# Patient Record
Sex: FEMALE | Race: WHITE | Employment: OTHER | URBAN - METROPOLITAN AREA
[De-identification: names, ages, dates, MRNs, and addresses within clinical notes are randomized per-mention and may not be internally consistent; named-entity substitution may affect disease eponyms.]

---

## 2017-01-15 PROBLEM — E83.51 HYPOCALCEMIA: Status: ACTIVE | Noted: 2017-01-15

## 2017-03-02 RX ORDER — METOCLOPRAMIDE 10 MG/1
10 TABLET ORAL ONCE
Status: CANCELLED | OUTPATIENT
Start: 2017-03-02 | End: 2017-03-02

## 2017-03-03 ENCOUNTER — ANESTHESIA EVENT (OUTPATIENT)
Dept: SURGERY | Facility: HOSPITAL | Age: 42
End: 2017-03-03
Payer: MEDICARE

## 2017-03-03 ENCOUNTER — SURGERY (OUTPATIENT)
Age: 42
End: 2017-03-03

## 2017-03-03 ENCOUNTER — HOSPITAL ENCOUNTER (OUTPATIENT)
Facility: HOSPITAL | Age: 42
Setting detail: HOSPITAL OUTPATIENT SURGERY
Discharge: HOME | End: 2017-03-03
Attending: OBSTETRICS & GYNECOLOGY | Admitting: OBSTETRICS & GYNECOLOGY
Payer: MEDICARE

## 2017-03-03 ENCOUNTER — ANESTHESIA (OUTPATIENT)
Dept: SURGERY | Facility: HOSPITAL | Age: 42
End: 2017-03-03
Payer: MEDICARE

## 2017-03-03 VITALS
WEIGHT: 170 LBS | SYSTOLIC BLOOD PRESSURE: 122 MMHG | BODY MASS INDEX: 33.38 KG/M2 | DIASTOLIC BLOOD PRESSURE: 80 MMHG | HEART RATE: 75 BPM | OXYGEN SATURATION: 100 % | TEMPERATURE: 98 F | HEIGHT: 60 IN | RESPIRATION RATE: 20 BRPM

## 2017-03-03 DIAGNOSIS — N84.0 ENDOMETRIAL POLYP: Primary | ICD-10-CM

## 2017-03-03 LAB — B-HCG UR QL: NEGATIVE

## 2017-03-03 PROCEDURE — 0UB98ZX EXCISION OF UTERUS, VIA NATURAL OR ARTIFICIAL OPENING ENDOSCOPIC, DIAGNOSTIC: ICD-10-PCS | Performed by: OBSTETRICS & GYNECOLOGY

## 2017-03-03 PROCEDURE — 0UDB8ZX EXTRACTION OF ENDOMETRIUM, VIA NATURAL OR ARTIFICIAL OPENING ENDOSCOPIC, DIAGNOSTIC: ICD-10-PCS | Performed by: OBSTETRICS & GYNECOLOGY

## 2017-03-03 PROCEDURE — 81025 URINE PREGNANCY TEST: CPT

## 2017-03-03 PROCEDURE — 88305 TISSUE EXAM BY PATHOLOGIST: CPT | Performed by: OBSTETRICS & GYNECOLOGY

## 2017-03-03 RX ORDER — ONDANSETRON 2 MG/ML
4 INJECTION INTRAMUSCULAR; INTRAVENOUS EVERY 8 HOURS PRN
Status: DISCONTINUED | OUTPATIENT
Start: 2017-03-03 | End: 2017-03-03

## 2017-03-03 RX ORDER — ACETAMINOPHEN 325 MG/1
650 TABLET ORAL ONCE
Status: DISCONTINUED | OUTPATIENT
Start: 2017-03-03 | End: 2017-03-03 | Stop reason: HOSPADM

## 2017-03-03 RX ORDER — HYDROCODONE BITARTRATE AND ACETAMINOPHEN 5; 325 MG/1; MG/1
2 TABLET ORAL EVERY 4 HOURS PRN
Status: DISCONTINUED | OUTPATIENT
Start: 2017-03-03 | End: 2017-03-03

## 2017-03-03 RX ORDER — DEXAMETHASONE SODIUM PHOSPHATE 4 MG/ML
VIAL (ML) INJECTION AS NEEDED
Status: DISCONTINUED | OUTPATIENT
Start: 2017-03-03 | End: 2017-03-03 | Stop reason: SURG

## 2017-03-03 RX ORDER — HYDROCODONE BITARTRATE AND ACETAMINOPHEN 5; 325 MG/1; MG/1
1 TABLET ORAL EVERY 4 HOURS PRN
Status: DISCONTINUED | OUTPATIENT
Start: 2017-03-03 | End: 2017-03-03

## 2017-03-03 RX ORDER — DOXYCYCLINE 100 MG/10ML
INJECTION, POWDER, LYOPHILIZED, FOR SOLUTION INTRAVENOUS AS NEEDED
Status: DISCONTINUED | OUTPATIENT
Start: 2017-03-03 | End: 2017-03-03 | Stop reason: SURG

## 2017-03-03 RX ORDER — MIDAZOLAM HYDROCHLORIDE 1 MG/ML
INJECTION INTRAMUSCULAR; INTRAVENOUS AS NEEDED
Status: DISCONTINUED | OUTPATIENT
Start: 2017-03-03 | End: 2017-03-03 | Stop reason: SURG

## 2017-03-03 RX ORDER — MORPHINE SULFATE 4 MG/ML
4 INJECTION, SOLUTION INTRAMUSCULAR; INTRAVENOUS EVERY 10 MIN PRN
Status: DISCONTINUED | OUTPATIENT
Start: 2017-03-03 | End: 2017-03-03

## 2017-03-03 RX ORDER — ACETAMINOPHEN 325 MG/1
650 TABLET ORAL EVERY 4 HOURS PRN
Status: DISCONTINUED | OUTPATIENT
Start: 2017-03-03 | End: 2017-03-03

## 2017-03-03 RX ORDER — MORPHINE SULFATE 2 MG/ML
2 INJECTION, SOLUTION INTRAMUSCULAR; INTRAVENOUS EVERY 10 MIN PRN
Status: DISCONTINUED | OUTPATIENT
Start: 2017-03-03 | End: 2017-03-03

## 2017-03-03 RX ORDER — HYDROMORPHONE HYDROCHLORIDE 1 MG/ML
0.4 INJECTION, SOLUTION INTRAMUSCULAR; INTRAVENOUS; SUBCUTANEOUS EVERY 2 HOUR PRN
Status: DISCONTINUED | OUTPATIENT
Start: 2017-03-03 | End: 2017-03-03

## 2017-03-03 RX ORDER — SODIUM CHLORIDE, SODIUM LACTATE, POTASSIUM CHLORIDE, CALCIUM CHLORIDE 600; 310; 30; 20 MG/100ML; MG/100ML; MG/100ML; MG/100ML
INJECTION, SOLUTION INTRAVENOUS CONTINUOUS
Status: DISCONTINUED | OUTPATIENT
Start: 2017-03-03 | End: 2017-03-03

## 2017-03-03 RX ORDER — ONDANSETRON 2 MG/ML
4 INJECTION INTRAMUSCULAR; INTRAVENOUS ONCE AS NEEDED
Status: DISCONTINUED | OUTPATIENT
Start: 2017-03-03 | End: 2017-03-03

## 2017-03-03 RX ORDER — HYDROCODONE BITARTRATE AND ACETAMINOPHEN 5; 325 MG/1; MG/1
2 TABLET ORAL AS NEEDED
Status: DISCONTINUED | OUTPATIENT
Start: 2017-03-03 | End: 2017-03-03

## 2017-03-03 RX ORDER — HYDROMORPHONE HYDROCHLORIDE 1 MG/ML
0.2 INJECTION, SOLUTION INTRAMUSCULAR; INTRAVENOUS; SUBCUTANEOUS EVERY 2 HOUR PRN
Status: DISCONTINUED | OUTPATIENT
Start: 2017-03-03 | End: 2017-03-03

## 2017-03-03 RX ORDER — HYDROMORPHONE HYDROCHLORIDE 1 MG/ML
0.8 INJECTION, SOLUTION INTRAMUSCULAR; INTRAVENOUS; SUBCUTANEOUS EVERY 2 HOUR PRN
Status: DISCONTINUED | OUTPATIENT
Start: 2017-03-03 | End: 2017-03-03

## 2017-03-03 RX ORDER — HYDROCODONE BITARTRATE AND ACETAMINOPHEN 5; 325 MG/1; MG/1
1 TABLET ORAL EVERY 6 HOURS PRN
Qty: 30 TABLET | Refills: 0 | Status: SHIPPED | COMMUNITY
Start: 2017-03-03 | End: 2017-06-19

## 2017-03-03 RX ORDER — HYDROCODONE BITARTRATE AND ACETAMINOPHEN 5; 325 MG/1; MG/1
1 TABLET ORAL AS NEEDED
Status: DISCONTINUED | OUTPATIENT
Start: 2017-03-03 | End: 2017-03-03

## 2017-03-03 RX ORDER — FAMOTIDINE 20 MG/1
20 TABLET ORAL ONCE
Status: COMPLETED | OUTPATIENT
Start: 2017-03-03 | End: 2017-03-03

## 2017-03-03 RX ORDER — NALOXONE HYDROCHLORIDE 0.4 MG/ML
80 INJECTION, SOLUTION INTRAMUSCULAR; INTRAVENOUS; SUBCUTANEOUS AS NEEDED
Status: DISCONTINUED | OUTPATIENT
Start: 2017-03-03 | End: 2017-03-03

## 2017-03-03 RX ORDER — LIDOCAINE HYDROCHLORIDE 10 MG/ML
INJECTION, SOLUTION EPIDURAL; INFILTRATION; INTRACAUDAL; PERINEURAL AS NEEDED
Status: DISCONTINUED | OUTPATIENT
Start: 2017-03-03 | End: 2017-03-03 | Stop reason: SURG

## 2017-03-03 RX ORDER — ONDANSETRON 2 MG/ML
INJECTION INTRAMUSCULAR; INTRAVENOUS AS NEEDED
Status: DISCONTINUED | OUTPATIENT
Start: 2017-03-03 | End: 2017-03-03 | Stop reason: SURG

## 2017-03-03 RX ORDER — MORPHINE SULFATE 10 MG/ML
6 INJECTION, SOLUTION INTRAMUSCULAR; INTRAVENOUS EVERY 10 MIN PRN
Status: DISCONTINUED | OUTPATIENT
Start: 2017-03-03 | End: 2017-03-03

## 2017-03-03 RX ORDER — HYDROMORPHONE HYDROCHLORIDE 1 MG/ML
0.2 INJECTION, SOLUTION INTRAMUSCULAR; INTRAVENOUS; SUBCUTANEOUS EVERY 5 MIN PRN
Status: DISCONTINUED | OUTPATIENT
Start: 2017-03-03 | End: 2017-03-03

## 2017-03-03 RX ORDER — HYDROMORPHONE HYDROCHLORIDE 1 MG/ML
0.4 INJECTION, SOLUTION INTRAMUSCULAR; INTRAVENOUS; SUBCUTANEOUS EVERY 5 MIN PRN
Status: DISCONTINUED | OUTPATIENT
Start: 2017-03-03 | End: 2017-03-03

## 2017-03-03 RX ORDER — ONDANSETRON HYDROCHLORIDE 8 MG/1
4 TABLET, FILM COATED ORAL EVERY 8 HOURS PRN
Status: DISCONTINUED | OUTPATIENT
Start: 2017-03-03 | End: 2017-03-03

## 2017-03-03 RX ORDER — HYDROMORPHONE HYDROCHLORIDE 1 MG/ML
0.6 INJECTION, SOLUTION INTRAMUSCULAR; INTRAVENOUS; SUBCUTANEOUS EVERY 5 MIN PRN
Status: DISCONTINUED | OUTPATIENT
Start: 2017-03-03 | End: 2017-03-03

## 2017-03-03 RX ADMIN — SODIUM CHLORIDE, SODIUM LACTATE, POTASSIUM CHLORIDE, CALCIUM CHLORIDE: 600; 310; 30; 20 INJECTION, SOLUTION INTRAVENOUS at 09:05:00

## 2017-03-03 RX ADMIN — SODIUM CHLORIDE, SODIUM LACTATE, POTASSIUM CHLORIDE, CALCIUM CHLORIDE: 600; 310; 30; 20 INJECTION, SOLUTION INTRAVENOUS at 09:50:00

## 2017-03-03 RX ADMIN — DOXYCYCLINE 200 MG: 100 INJECTION, POWDER, LYOPHILIZED, FOR SOLUTION INTRAVENOUS at 09:20:00

## 2017-03-03 RX ADMIN — MIDAZOLAM HYDROCHLORIDE 2 MG: 1 INJECTION INTRAMUSCULAR; INTRAVENOUS at 09:05:00

## 2017-03-03 RX ADMIN — DEXAMETHASONE SODIUM PHOSPHATE 4 MG: 4 MG/ML VIAL (ML) INJECTION at 09:06:00

## 2017-03-03 RX ADMIN — ONDANSETRON 4 MG: 2 INJECTION INTRAMUSCULAR; INTRAVENOUS at 09:45:00

## 2017-03-03 RX ADMIN — LIDOCAINE HYDROCHLORIDE 30 MG: 10 INJECTION, SOLUTION EPIDURAL; INFILTRATION; INTRACAUDAL; PERINEURAL at 09:05:00

## 2017-03-03 NOTE — INTERVAL H&P NOTE
Pre-op Diagnosis: Menorrhagia with regular cycle    The above referenced H&P was reviewed by Mo Guadalupe MD on 3/3/2017, the patient was examined and no significant changes have occurred in the patient's condition since the H&P was performed.   I discus

## 2017-03-03 NOTE — DISCHARGE SUMMARY
[facility] HOSP - [facility]    Discharge Summary    [facility]:  [facility]    1975 MRN [id number]   Location [place] [address] Attending Antonino Nichols MD   Hosp Day # 0 PCP Saturnino Roberts MD, Myles Alonso tolerated    Discharge Activity: As tolerated    Follow up:            Other Discharge Instructions:        HOME INSTRUCTIONS  AMBSURG HOME CARE INSTRUCTIONS: POST-OP ANESTHESIA  The medication that you received for sedation or general anesthesia can last u problem when you are at home:    · Call your surgeons office         30 Days    After Your Surgery    [facility]'s commitment to quality care does not end  when you leave the hospital    We are gathering information on the outcomes of our patients a

## 2017-03-03 NOTE — ANESTHESIA POSTPROCEDURE EVALUATION
Patient: Samir Le    Procedure Summary     Date Anesthesia Start Anesthesia Stop Room / Location    03/03/17 0904 0959 300 [facility] MAIN OR 03 / 300 [facility] MAIN OR       Procedure Diagnosis Surgeon Responsible Provider    MYOMECTOMY HYSTEROSCOPIC (N/A Uterus) (Shyanne Oh

## 2017-03-03 NOTE — H&P (VIEW-ONLY)
Pre-Operative History and Physical    Diagnosis: No diagnosis found. Planned Procedure: hysteroscopy, D&C possible morcellation. HPI:  Marixa Serra is a 42year old  No LMP recorded.  who presents for hysteroscopy, D&C    Had US done, showed ma SURGERY  12/10/14    Comment left AKIL    HIP REPLACEMENT SURGERY  2015    Comment Right AKIL, Dr. Yen Camarena  1/2014    Comment Left knee arthroscopy    OTHER SURGICAL HISTORY      Comment nathaly bunionectomies and correction toes    OTHER Smokeless tobacco: Not on file    Alcohol Use: No    Drug Use: No    Sexual Activity: Not Currently     Other Topics Concern    Exercise Yes    Comment: walking     Social History Narrative    SOC HX:    occupation: graphic design, marital status: single,

## 2017-03-03 NOTE — OPERATIVE REPORT
[facility]    PATIENT'S NAME: BAKARI ARROYO   ATTENDING PHYSICIAN: Ashley Banks MD   OPERATING PHYSICIAN: Ashley Banks MD   PATIENT ACCOUNT#:   [id number]    LOCATION:  [place] [address]  MEDICAL RECORD #:   [id number]       DATE OF BIRTH: room in good condition.     Dictated By Simba Maldonado MD  d: 03/03/2017 09:56:30  t: 03/03/2017 17:36:46  Job [id number]  JF/

## 2017-03-03 NOTE — BRIEF OP NOTE
Richie 44  Brief Op Note     Allison Bottom Location: OR   CSN [id number] MRN [id number]   Admission Date 3/3/2017 Operation Date 3/3/2017   Attending Physician Gaston Hallman MD Operating Physician Kenneth Oakley MD       Pre-Operat

## 2017-03-03 NOTE — ANESTHESIA PREPROCEDURE EVALUATION
Anesthesia PreOp Note    HPI:     Sam Hutson is a 42year old female who presents for preoperative consultation requested by: Steffi Rosa MD    Date of Surgery: 3/3/2017    Procedure(s):   MYOMECTOMY HYSTEROSCOPIC  Indication: Menorrhagia with regula without aura, without mention of intractable migraine without mention of status migrainosus         Date Noted: 07/02/2007      Unspecified hypothyroidism         Date Noted: 03/06/2007      Irritable bowel syndrome         Date Noted: 03/06/2007        Pa at Unknown time   TraZODone HCl 50 MG Oral Tab nightly as needed.    Disp:  Rfl:  3/2/2017 at Unknown time   ferrous sulfate 325 (65 FE) MG Oral Tab EC Take 325 mg by mouth daily with breakfast. Disp:  Rfl:  3/2/2017 at Unknown time   Pantoprazole Sodium (P CRNA 200 mg at 03/03/17 0920     No current [facility]-ordered outpatient prescriptions on file.       Cat Dander [Dander]       Meloxicam               Coughing  Seasonal                    Family History   Problem Relation Age of Onset   • Cancer Mother      ce Pulse: 72   Temp: 98.1 °F (36.7 °C)   TempSrc: Oral   Resp: 19   Height: 1.524 m (5')   Weight: 77.111 kg (170 lb)   SpO2: 93%        Anesthesia ROS/Med Hx and Physical Exam     Patient summary reviewed and Nursing notes reviewed    No history of anesthe

## 2017-04-06 PROCEDURE — 86038 ANTINUCLEAR ANTIBODIES: CPT | Performed by: INTERNAL MEDICINE

## 2017-04-06 PROCEDURE — 87340 HEPATITIS B SURFACE AG IA: CPT | Performed by: INTERNAL MEDICINE

## 2017-04-06 PROCEDURE — 86803 HEPATITIS C AB TEST: CPT | Performed by: INTERNAL MEDICINE

## 2017-04-06 PROCEDURE — 86704 HEP B CORE ANTIBODY TOTAL: CPT | Performed by: INTERNAL MEDICINE

## 2017-04-06 PROCEDURE — 86812 HLA TYPING A B OR C: CPT | Performed by: INTERNAL MEDICINE

## 2017-05-31 PROBLEM — M06.09 RHEUMATOID ARTHRITIS OF MULTIPLE SITES WITH NEGATIVE RHEUMATOID FACTOR (HCC): Status: ACTIVE | Noted: 2017-05-31

## 2017-06-20 PROBLEM — M54.41 CHRONIC LOW BACK PAIN WITH RIGHT-SIDED SCIATICA, UNSPECIFIED BACK PAIN LATERALITY: Status: ACTIVE | Noted: 2017-06-20

## 2017-06-20 PROBLEM — G89.29 CHRONIC LOW BACK PAIN WITH RIGHT-SIDED SCIATICA, UNSPECIFIED BACK PAIN LATERALITY: Status: ACTIVE | Noted: 2017-06-20

## 2017-12-13 PROBLEM — Z79.899 ENCOUNTER FOR DRUG THERAPY: Status: ACTIVE | Noted: 2017-12-13

## 2017-12-13 PROCEDURE — 86200 CCP ANTIBODY: CPT | Performed by: INTERNAL MEDICINE

## 2018-04-15 PROBLEM — Z96.652 STATUS POST LEFT KNEE REPLACEMENT: Status: ACTIVE | Noted: 2018-04-15

## 2018-04-15 PROBLEM — M25.561 RIGHT KNEE PAIN, UNSPECIFIED CHRONICITY: Status: ACTIVE | Noted: 2018-04-15

## 2018-04-15 PROBLEM — M16.11 PRIMARY OSTEOARTHRITIS OF RIGHT HIP: Status: ACTIVE | Noted: 2018-04-15

## 2018-08-06 PROBLEM — M51.36 LUMBAR DEGENERATIVE DISC DISEASE: Status: ACTIVE | Noted: 2018-08-06

## 2018-08-06 PROBLEM — M51.369 LUMBAR DEGENERATIVE DISC DISEASE: Status: ACTIVE | Noted: 2018-08-06

## 2018-08-28 PROCEDURE — 86480 TB TEST CELL IMMUN MEASURE: CPT | Performed by: INTERNAL MEDICINE

## 2018-09-07 PROBLEM — K80.20 CALCULUS OF GALLBLADDER WITHOUT CHOLECYSTITIS WITHOUT OBSTRUCTION: Chronic | Status: ACTIVE | Noted: 2018-09-07

## 2018-10-20 PROCEDURE — 87186 SC STD MICRODIL/AGAR DIL: CPT | Performed by: EMERGENCY MEDICINE

## 2018-10-20 PROCEDURE — 87088 URINE BACTERIA CULTURE: CPT | Performed by: EMERGENCY MEDICINE

## 2018-10-20 PROCEDURE — 87086 URINE CULTURE/COLONY COUNT: CPT | Performed by: EMERGENCY MEDICINE

## 2018-11-13 PROBLEM — Z96.659 HISTORY OF PARTIAL KNEE REPLACEMENT: Status: ACTIVE | Noted: 2018-04-15

## 2019-02-26 PROCEDURE — 87086 URINE CULTURE/COLONY COUNT: CPT | Performed by: PHYSICIAN ASSISTANT

## 2019-03-21 PROCEDURE — 88304 TISSUE EXAM BY PATHOLOGIST: CPT | Performed by: SURGERY

## 2019-07-10 ENCOUNTER — PATIENT MESSAGE (OUTPATIENT)
Dept: RHEUMATOLOGY | Facility: CLINIC | Age: 44
End: 2019-07-10

## 2019-07-11 NOTE — TELEPHONE ENCOUNTER
From: Americo Teixeira  To: Yee Bruno MD  Sent: 7/10/2019 11:25 PM CDT  Subject: Other    I will be a new patient with you. I have an appointment scheduled with you on August 7.  I tried to fill out some of the forms on line, but they were a little co

## 2019-08-07 ENCOUNTER — OFFICE VISIT (OUTPATIENT)
Dept: RHEUMATOLOGY | Facility: CLINIC | Age: 44
End: 2019-08-07
Payer: MEDICARE

## 2019-08-07 VITALS
WEIGHT: 166 LBS | HEIGHT: 60 IN | BODY MASS INDEX: 32.59 KG/M2 | DIASTOLIC BLOOD PRESSURE: 76 MMHG | HEART RATE: 72 BPM | SYSTOLIC BLOOD PRESSURE: 115 MMHG

## 2019-08-07 DIAGNOSIS — M06.09 RHEUMATOID ARTHRITIS OF MULTIPLE SITES WITH NEGATIVE RHEUMATOID FACTOR (HCC): Primary | ICD-10-CM

## 2019-08-07 PROCEDURE — 99204 OFFICE O/P NEW MOD 45 MIN: CPT | Performed by: INTERNAL MEDICINE

## 2019-08-07 PROCEDURE — G0463 HOSPITAL OUTPT CLINIC VISIT: HCPCS | Performed by: INTERNAL MEDICINE

## 2019-08-07 RX ORDER — TRAZODONE HYDROCHLORIDE 50 MG/1
50 TABLET ORAL NIGHTLY PRN
Refills: 1 | COMMUNITY
Start: 2019-07-12 | End: 2021-05-05 | Stop reason: ALTCHOICE

## 2019-08-07 NOTE — PROGRESS NOTES
Dear Dr. Christianne Kelly:    I saw your patient Kylee Yost in consultation this afternoon in my rheumatology clinic, concerning seronegative rheumatoid arthritis. As you know, she is an unfortunate 79-year-old woman who in 2014 developed knee and hip pain.   She had fists and  objects. On review of her chart April of 2017, HLA-B27, CCP antibody, and SHI were negative. December of 2017, CCP antibody and rheumatoid factor were negative.   August 2018, C-reactive protein and sed rate of 9 were normal.  April 25th, inflammation. Recently she has had an external ear infection that is almost gone. No lymphadenopathy. No shortness of breath or chest pain. No acid reflux, stomach pain, nausea or vomiting, constipation or diarrhea, blood in her stools.   No trouble uri hip replacements. Left knee  patellofemoral prosthesis. Bilateral bunion and hammertoe surgeries. 2.  History of seronegative rheumatoid arthritis. This seems to be a questionable diagnosis.   I do not detect synovitis on exam.  Labs and x-rays to my

## 2019-08-25 ENCOUNTER — PATIENT MESSAGE (OUTPATIENT)
Dept: RHEUMATOLOGY | Facility: CLINIC | Age: 44
End: 2019-08-25

## 2019-08-26 ENCOUNTER — TELEPHONE (OUTPATIENT)
Dept: RHEUMATOLOGY | Facility: CLINIC | Age: 44
End: 2019-08-26

## 2019-08-26 NOTE — TELEPHONE ENCOUNTER
Regarding: Other  Contact: [phone]  ----- Message from Michelle Peacock RN sent at 8/26/2019  3:24 PM CDT -----       ----- Message from Isaiah Harkins to Nupur Miller MD sent at 8/25/2019 10:10 AM -----   Doctor Lambert Spatz,  I saw a previous doctor a while ago, and then returned to [place] [address]. He had recommended that I use a walker. The letter of recommendation is a little old, and I was hoping that you could give me an updated letter so I could have it on file. I've tried to walk with my cane and it is still difficult walking just a 1/2 block with it. So I am still using the walker for long distances. I am hoping after I start the Arthritis treatment it will get better. I am still doing the Yoga in a chair (trying to do the exercises that I can). Also the cyst on my right hand is starting to bother me a lot. I've attached that letter so you could see it regarding me needing the walker. I will see you on the 28th. And I've arranged for transportation to come later to pick me up in case you are running behind. They also said I could call them. So no worries. (I can also walk to the train station slowly and take the train back if I need to. It's not that far from your office).   Poly Allen  [phone]

## 2019-08-26 NOTE — TELEPHONE ENCOUNTER
From: Norm Drummond  To: Cate Park MD  Sent: 8/25/2019 10:10 AM CDT  Subject: Other    Doctor Inocencio Perez,  I saw a previous doctor a while ago, and then returned to [place] [address]. He had recommended that I use a walker.  The letter of recomme

## 2019-08-26 NOTE — TELEPHONE ENCOUNTER
Please see pt's request below. Letter of recommendation for use of walker pended if agreeable. Pt has f/u appt on 8/28.

## 2019-08-28 ENCOUNTER — TELEPHONE (OUTPATIENT)
Dept: RHEUMATOLOGY | Facility: CLINIC | Age: 44
End: 2019-08-28

## 2019-08-28 ENCOUNTER — PATIENT MESSAGE (OUTPATIENT)
Dept: RHEUMATOLOGY | Facility: CLINIC | Age: 44
End: 2019-08-28

## 2019-08-28 ENCOUNTER — OFFICE VISIT (OUTPATIENT)
Dept: RHEUMATOLOGY | Facility: CLINIC | Age: 44
End: 2019-08-28
Payer: MEDICARE

## 2019-08-28 VITALS
SYSTOLIC BLOOD PRESSURE: 109 MMHG | BODY MASS INDEX: 32 KG/M2 | HEIGHT: 60 IN | DIASTOLIC BLOOD PRESSURE: 73 MMHG | WEIGHT: 163 LBS | HEART RATE: 80 BPM

## 2019-08-28 DIAGNOSIS — M06.09 RHEUMATOID ARTHRITIS OF MULTIPLE SITES WITH NEGATIVE RHEUMATOID FACTOR (HCC): Primary | ICD-10-CM

## 2019-08-28 PROCEDURE — 99213 OFFICE O/P EST LOW 20 MIN: CPT | Performed by: INTERNAL MEDICINE

## 2019-08-28 PROCEDURE — G0463 HOSPITAL OUTPT CLINIC VISIT: HCPCS | Performed by: INTERNAL MEDICINE

## 2019-08-28 NOTE — TELEPHONE ENCOUNTER
Please see pt's message below. Pt was seen in office today and provider's note not completed at this time.

## 2019-08-28 NOTE — TELEPHONE ENCOUNTER
Pt states she would like to know what her treatment care she will be receiving from Dr. Prais Puckett per pt she has been with pain and by now she should be in some kind of medication.  Per pt unsure why she needs to get an MRI for her wrist. Per pt she has RA

## 2019-08-28 NOTE — TELEPHONE ENCOUNTER
Please let her know that it is important to get the MRI of her wrist and hand. We need to see how much joint inflammation there is, so that we can determine her best treatment.     Once I have reviewed her hand MRI results, I will call her, and we will jasiel

## 2019-08-29 ENCOUNTER — PATIENT MESSAGE (OUTPATIENT)
Dept: RHEUMATOLOGY | Facility: CLINIC | Age: 44
End: 2019-08-29

## 2019-08-29 NOTE — TELEPHONE ENCOUNTER
From: Caron Turner  To: January Calle MD  Sent: 8/28/2019 6:20 PM CDT  Subject: Other    Doctor Lu Acosta, or Kristopher Duong,  Thank you for providing me with the updated letter for the walker needed. However, I do not have a working printer at this time.  Wo replacement, but maybe you can if you can access my records. But my left knee was done in 2016 by Doctor Irene Britt. [facility] Petellofemoral replacement. [employer] cobalt, chromium and polyethylene.  Hope cathleen

## 2019-08-29 NOTE — TELEPHONE ENCOUNTER
From: Marixa Serra  To: Michelle Holden MD  Sent: 8/29/2019 10:18 AM CDT  Subject: Other    Doctor Criss Julian and RN Clif Davenport,  I apologize about the last message I was confused but Clif Davenport helped explain things. Now I understand your plan of action.  Please no

## 2019-08-29 NOTE — TELEPHONE ENCOUNTER
Pt expressed concerns in regards to traveling to hospital location for MRI. Per [place] imaging dept, hand MRIs are only completed at main campus due to specialized coil.      Pt was informed MRI will need to be scheduled at hospital and provided with infor

## 2019-08-29 NOTE — PROGRESS NOTES
Dear Dr. Lyudmila Solano:    I saw your patient Demetris Reardon in follow-up this afternoon in my rheumatology clinic. She has swelling and soreness across her hand MCP joints, right greater than left, worse in the morning. It takes 20 minutes to loosen.   A warm show disorder, on treatment. 5.  Apparent bilateral hand ganglion cysts. Thank you for inviting me to participate in her care. Her follow-up will be set up after her hand MRI results are reviewed. Sincerely,      Francheska Miguel MD   Rheumatology.

## 2019-08-29 NOTE — TELEPHONE ENCOUNTER
HANSEL Bolaños pt was informed MRI of hand needs to be completed at main campus due to specialized MRI coils (which are not available in [place]).  She was provided with [employer] transportation services contact information to set up transportation between [address] [place] [address]

## 2019-08-29 NOTE — TELEPHONE ENCOUNTER
Pt returned call and was informed of Dr. Andria Hernandez message below. Pt verbalized understanding. Pt aware no PA required for MRI and she may proceed with scheduling test. Pt was transferred to central scheduling.

## 2020-01-22 PROBLEM — M67.432 GANGLION CYST OF BOTH WRISTS: Status: ACTIVE | Noted: 2020-01-22

## 2020-01-22 PROBLEM — M67.431 GANGLION CYST OF BOTH WRISTS: Status: ACTIVE | Noted: 2020-01-22

## 2020-01-22 PROBLEM — M15.9 PRIMARY OSTEOARTHRITIS INVOLVING MULTIPLE JOINTS: Status: ACTIVE | Noted: 2020-01-22

## 2020-01-22 PROBLEM — M15.0 PRIMARY OSTEOARTHRITIS INVOLVING MULTIPLE JOINTS: Status: ACTIVE | Noted: 2020-01-22

## 2020-11-12 PROBLEM — M79.7 FIBROMYALGIA: Status: ACTIVE | Noted: 2020-11-12

## 2020-11-12 PROBLEM — M06.09 RHEUMATOID ARTHRITIS OF MULTIPLE SITES WITH NEGATIVE RHEUMATOID FACTOR (HCC): Status: RESOLVED | Noted: 2017-05-31 | Resolved: 2020-11-12

## 2020-11-12 PROBLEM — M25.561 RIGHT KNEE PAIN, UNSPECIFIED CHRONICITY: Status: RESOLVED | Noted: 2018-04-15 | Resolved: 2020-11-12

## 2020-11-12 PROBLEM — M16.11 PRIMARY OSTEOARTHRITIS OF RIGHT HIP: Status: RESOLVED | Noted: 2018-04-15 | Resolved: 2020-11-12

## 2020-11-12 PROBLEM — M54.41 CHRONIC LOW BACK PAIN WITH RIGHT-SIDED SCIATICA, UNSPECIFIED BACK PAIN LATERALITY: Status: RESOLVED | Noted: 2017-06-20 | Resolved: 2020-11-12

## 2020-11-12 PROBLEM — L40.50 PSORIATIC ARTHRITIS (HCC): Status: ACTIVE | Noted: 2020-11-12

## 2020-11-12 PROBLEM — G89.29 CHRONIC LOW BACK PAIN WITH RIGHT-SIDED SCIATICA, UNSPECIFIED BACK PAIN LATERALITY: Status: RESOLVED | Noted: 2017-06-20 | Resolved: 2020-11-12

## 2020-11-12 PROBLEM — Z79.899 ENCOUNTER FOR DRUG THERAPY: Status: RESOLVED | Noted: 2017-12-13 | Resolved: 2020-11-12

## 2020-11-12 PROBLEM — M51.36 LUMBAR DEGENERATIVE DISC DISEASE: Status: RESOLVED | Noted: 2018-08-06 | Resolved: 2020-11-12

## 2020-11-12 PROBLEM — M67.432 GANGLION CYST OF BOTH WRISTS: Status: RESOLVED | Noted: 2020-01-22 | Resolved: 2020-11-12

## 2020-11-12 PROBLEM — M67.431 GANGLION CYST OF BOTH WRISTS: Status: RESOLVED | Noted: 2020-01-22 | Resolved: 2020-11-12

## 2020-11-12 PROBLEM — M51.369 LUMBAR DEGENERATIVE DISC DISEASE: Status: RESOLVED | Noted: 2018-08-06 | Resolved: 2020-11-12

## 2021-04-09 DIAGNOSIS — Z23 NEED FOR VACCINATION: ICD-10-CM

## 2021-05-05 PROBLEM — I89.0 CHRONIC ACQUIRED LYMPHEDEMA: Status: ACTIVE | Noted: 2021-05-05

## 2021-05-05 PROBLEM — F32.A ANXIETY AND DEPRESSION: Status: ACTIVE | Noted: 2021-05-05

## 2021-05-05 PROBLEM — M79.7 FIBROMYALGIA: Status: RESOLVED | Noted: 2020-11-12 | Resolved: 2021-05-05

## 2021-05-05 PROBLEM — K80.20 CALCULUS OF GALLBLADDER WITHOUT CHOLECYSTITIS WITHOUT OBSTRUCTION: Chronic | Status: RESOLVED | Noted: 2018-09-07 | Resolved: 2021-05-05

## 2021-05-05 PROBLEM — F41.9 ANXIETY AND DEPRESSION: Status: ACTIVE | Noted: 2021-05-05

## 2021-05-05 PROBLEM — Z96.659 HISTORY OF PARTIAL KNEE REPLACEMENT: Status: RESOLVED | Noted: 2018-04-15 | Resolved: 2021-05-05

## 2021-07-29 PROBLEM — T84.84XA PAIN DUE TO KNEE JOINT PROSTHESIS: Status: ACTIVE | Noted: 2021-07-29

## 2021-07-29 PROBLEM — Z96.659 PAIN DUE TO KNEE JOINT PROSTHESIS: Status: ACTIVE | Noted: 2021-07-29

## 2021-07-29 PROBLEM — T84.84XA: Status: ACTIVE | Noted: 2021-07-29

## 2021-07-29 PROBLEM — T84.84XA PAIN DUE TO KNEE JOINT PROSTHESIS (HCC): Status: ACTIVE | Noted: 2021-07-29

## 2021-07-29 PROBLEM — Z96.659 PAIN DUE TO KNEE JOINT PROSTHESIS (HCC): Status: ACTIVE | Noted: 2021-07-29

## 2021-07-29 PROBLEM — Z96.659: Status: ACTIVE | Noted: 2021-07-29

## 2021-07-30 PROBLEM — M17.12 PRIMARY OSTEOARTHRITIS OF LEFT KNEE: Status: ACTIVE | Noted: 2021-07-30

## 2022-01-28 PROBLEM — R82.998 LOW URINARY CORTISOL: Status: ACTIVE | Noted: 2022-01-28

## 2022-03-18 ENCOUNTER — PATIENT MESSAGE (OUTPATIENT)
Dept: RHEUMATOLOGY | Facility: CLINIC | Age: 47
End: 2022-03-18

## 2022-03-18 NOTE — TELEPHONE ENCOUNTER
From: Sudha Espitia  To: Oseas Petit MD  Sent: 3/18/2022 1:09 AM CDT  Subject: Could I be seen by you again as a patient? I am currently looking for a Rheumatologist again because my symptoms have gotten worse. I am hoping you would be willing to treat me again. The issue before was with transportation. I will figure out a way to get to the [place] location if you are still there. I am going to schedule an appointment with you. And what ever treatment you recommend I will agree to. I am tired of being in physical pain all the time. Thank you.  Maryuri Doss

## 2022-03-18 NOTE — TELEPHONE ENCOUNTER
Patient has scheduled appointment.     Future Appointments   Date Time Provider Urmila Forrester   3/21/2022 12:40 PM MD ALLA Velez   4/19/2022  9:00 AM Pilar Soler MD [facility] [place]

## 2022-04-19 ENCOUNTER — HOSPITAL ENCOUNTER (OUTPATIENT)
Dept: GENERAL RADIOLOGY | Age: 47
Discharge: HOME | End: 2022-04-19
Attending: INTERNAL MEDICINE
Payer: MEDICARE

## 2022-04-19 ENCOUNTER — PATIENT MESSAGE (OUTPATIENT)
Dept: RHEUMATOLOGY | Facility: CLINIC | Age: 47
End: 2022-04-19

## 2022-04-19 ENCOUNTER — OFFICE VISIT (OUTPATIENT)
Dept: RHEUMATOLOGY | Facility: CLINIC | Age: 47
End: 2022-04-19
Payer: MEDICARE

## 2022-04-19 ENCOUNTER — LAB ENCOUNTER (OUTPATIENT)
Dept: LAB | Age: 47
End: 2022-04-19
Attending: INTERNAL MEDICINE
Payer: MEDICARE

## 2022-04-19 VITALS
BODY MASS INDEX: 35.73 KG/M2 | HEART RATE: 75 BPM | WEIGHT: 182 LBS | HEIGHT: 60 IN | SYSTOLIC BLOOD PRESSURE: 120 MMHG | DIASTOLIC BLOOD PRESSURE: 77 MMHG

## 2022-04-19 DIAGNOSIS — M06.09 RHEUMATOID ARTHRITIS OF MULTIPLE SITES WITH NEGATIVE RHEUMATOID FACTOR (HCC): ICD-10-CM

## 2022-04-19 DIAGNOSIS — R53.83 FATIGUE, UNSPECIFIED TYPE: ICD-10-CM

## 2022-04-19 DIAGNOSIS — Z51.81 THERAPEUTIC DRUG MONITORING: ICD-10-CM

## 2022-04-19 DIAGNOSIS — M15.9 OSTEOARTHRITIS OF MULTIPLE JOINTS, UNSPECIFIED OSTEOARTHRITIS TYPE: ICD-10-CM

## 2022-04-19 DIAGNOSIS — M06.09 RHEUMATOID ARTHRITIS OF MULTIPLE SITES WITH NEGATIVE RHEUMATOID FACTOR (HCC): Primary | ICD-10-CM

## 2022-04-19 DIAGNOSIS — L40.9 PSORIASIS: ICD-10-CM

## 2022-04-19 LAB
ALBUMIN SERPL-MCNC: 3.8 G/DL (ref 3.4–5)
ALBUMIN/GLOB SERPL: 1.2 {RATIO} (ref 1–2)
ALP LIVER SERPL-CCNC: 63 U/L
ALT SERPL-CCNC: 19 U/L
ANION GAP SERPL CALC-SCNC: 10 MMOL/L (ref 0–18)
AST SERPL-CCNC: 14 U/L (ref 15–37)
BILIRUB SERPL-MCNC: 0.4 MG/DL (ref 0.1–2)
BUN BLD-MCNC: 8 MG/DL (ref 7–18)
BUN/CREAT SERPL: 11.9 (ref 10–20)
CALCIUM BLD-MCNC: 7.7 MG/DL (ref 8.5–10.1)
CHLORIDE SERPL-SCNC: 106 MMOL/L (ref 98–112)
CO2 SERPL-SCNC: 25 MMOL/L (ref 21–32)
CREAT BLD-MCNC: 0.67 MG/DL
DEPRECATED RDW RBC AUTO: 46.7 FL (ref 35.1–46.3)
ERYTHROCYTE [DISTWIDTH] IN BLOOD BY AUTOMATED COUNT: 13.6 % (ref 11–15)
ERYTHROCYTE [SEDIMENTATION RATE] IN BLOOD: 17 MM/HR
FASTING STATUS PATIENT QL REPORTED: NO
GLOBULIN PLAS-MCNC: 3.3 G/DL (ref 2.8–4.4)
GLUCOSE BLD-MCNC: 93 MG/DL (ref 70–99)
HCT VFR BLD AUTO: 39.7 %
HGB BLD-MCNC: 12.7 G/DL
MCH RBC QN AUTO: 30.1 PG (ref 26–34)
MCHC RBC AUTO-ENTMCNC: 32 G/DL (ref 31–37)
MCV RBC AUTO: 94.1 FL
OSMOLALITY SERPL CALC.SUM OF ELEC: 290 MOSM/KG (ref 275–295)
PLATELET # BLD AUTO: 258 10(3)UL (ref 150–450)
POTASSIUM SERPL-SCNC: 4.1 MMOL/L (ref 3.5–5.1)
PROT SERPL-MCNC: 7.1 G/DL (ref 6.4–8.2)
RBC # BLD AUTO: 4.22 X10(6)UL
RHEUMATOID FACT SERPL-ACNC: <10 IU/ML (ref ?–15)
SODIUM SERPL-SCNC: 141 MMOL/L (ref 136–145)
WBC # BLD AUTO: 11.4 X10(3) UL (ref 4–11)

## 2022-04-19 PROCEDURE — 86431 RHEUMATOID FACTOR QUANT: CPT

## 2022-04-19 PROCEDURE — 85652 RBC SED RATE AUTOMATED: CPT

## 2022-04-19 PROCEDURE — 73560 X-RAY EXAM OF KNEE 1 OR 2: CPT | Performed by: INTERNAL MEDICINE

## 2022-04-19 PROCEDURE — 80053 COMPREHEN METABOLIC PANEL: CPT

## 2022-04-19 PROCEDURE — 86200 CCP ANTIBODY: CPT

## 2022-04-19 PROCEDURE — 99214 OFFICE O/P EST MOD 30 MIN: CPT | Performed by: INTERNAL MEDICINE

## 2022-04-19 PROCEDURE — 73630 X-RAY EXAM OF FOOT: CPT | Performed by: INTERNAL MEDICINE

## 2022-04-19 PROCEDURE — 85027 COMPLETE CBC AUTOMATED: CPT

## 2022-04-19 PROCEDURE — 73130 X-RAY EXAM OF HAND: CPT | Performed by: INTERNAL MEDICINE

## 2022-04-19 PROCEDURE — 36415 COLL VENOUS BLD VENIPUNCTURE: CPT

## 2022-04-19 PROCEDURE — 86480 TB TEST CELL IMMUN MEASURE: CPT

## 2022-04-19 RX ORDER — TRAZODONE HYDROCHLORIDE 50 MG/1
50 TABLET ORAL NIGHTLY PRN
COMMUNITY
Start: 2021-12-14

## 2022-04-19 RX ORDER — QUETIAPINE FUMARATE 100 MG/1
100 TABLET, FILM COATED ORAL NIGHTLY
COMMUNITY
Start: 2022-03-11

## 2022-04-19 RX ORDER — TOPIRAMATE 25 MG/1
25 TABLET ORAL DAILY
COMMUNITY
Start: 2022-03-11

## 2022-04-19 NOTE — TELEPHONE ENCOUNTER
From: India Painter  To: Purvi Gonsales MD  Sent: 4/19/2022 1:27 PM CDT  Subject: Visit Summary Notes I never had Covid 19    I noticed it was stated that I had Covid 19 and I never had Covid 19. I was tested for it, but it came back negative. I've also had all of my vaccine shots. Please see the documentation I provided. If you have any questions please let m know. Thank you for seeing me today and for your help to get better.  Roxie Monique

## 2022-04-19 NOTE — TELEPHONE ENCOUNTER
Sent patient MyChart message. Deleted from her note that she had COVID-19 infection. I misunderstood.

## 2022-04-19 NOTE — TELEPHONE ENCOUNTER
From: Iris Colon  To: Haven Brown MD  Sent: 4/19/2022 12:45 PM CDT  Subject: Lab work and X rays completed    I was able to complete everything you wanted me to do today, blood work and x rays. If you decide on any more testing let me know and I will complete those to. Thank you. And I will see you next week.  Mina Guaman

## 2022-04-19 NOTE — PROGRESS NOTES
Dear Dr. Johnny Lee:    I saw your patient Polo Richardson again in my rheumatology clinic this morning. I had seen her years ago, with the diagnosis of seronegative rheumatoid arthritis. I ordered a right hand hand MRI with contrast to document, but it wasn't done. She then worked with several [place] [address] rheumatologists. Dr. Geraldine oLya last saw her March of 2021, at which time she was injecting Humira, taking leflunomide 20 mg daily, and naproxen. She felt like she was having side effects from Humira, including dizzy spells, scattered rashes on her extremities, a lot of stomach upset issues like diarrhea, and drowsiness. She subsequently stopped Humira, and does not feel that it helped. She then stopped leflunomide, which she felt did help. She is off nonsteroidals, and is taking acetaminophen. She is not doing well. She has a lot of swelling and pain. She is not very mobile. She uses a walker, which makes her feel safer. She continues to have swelling and bumps over her dorsal wrists and proximal hands over the extensor surfaces. She feels it across her hand MCP and PIP joints, which can make it harder to make tight fists and  objects in the morning. At night she tosses and turns, with diffuse discomfort. She is not refreshed on awakening. Her energy is low. Any activity wipes her out. When she gets out of bed in the morning it can be hard. Her joints are stiff, including her hands, knees, feet, elbows, shoulders, ankles, and the balls of her feet. Warm water makes her arthritis worse. Stairs are difficult, and she feels like there has been damage in her knees. She has gained from 163 to 182 pounds since I saw her. She gave up sodas months ago. In 2019 multiple CCP antibodies and rheumatoid factors were negative. She had ACL reconstruction of her left knee in 2014, unicompartmental arthroplasty with new patella 2016, and meniscal repair in 2018.   She has seen 7 different orthopedic surgeons. 3 of 7 wanted to replace her left knee. X-rays of her left knee 2021 showed unicompartmental arthroplasty, with mild to moderate osteoarthritis medially and laterally. Right shoulder x-rays in 2020 showed calcification greater tuberosity, suggesting possible calcific tendinitis. Hand x-rays 2020 were fine except bilateral scaphoid cysts. Her left knee has been aspirated of 20 cc of fluid on multiple occasions. Crystals nor infection have been found. On 1 check, there were 11,000 white cells with 47% PMNs. Her sed rate has always been normal.  Her C-reactive proteins have been elevated. Past medical history:  Lorrie Fraga had a uterine polyp removed with that was benign. She had her gallbladder removed. She had bilateral bunion and hammertoe surgeries. She had both hips replaced. She has obesity, migraines, bipolar disorder, borderline personality disorder, and sees a psychiatrist every 2 months and a therapist every 2 weeks. She is on acetaminophen as needed, acetaminophen with caffeine during her menstrual periods, albuterol inhaler rarely for cough, Wellbutrin, calcium, Celexa, Synthroid, Seroquel, topiramate, trazodone infrequently at bedtime. She is intolerant of Lamictal.  She believes she had side effects from Humira. Family history:  She has an aunt who she believes has rheumatoid arthritis and multiple sclerosis. She  of lymphoma. She believes her grandmother had rheumatoid arthritis. Social history:  She is single and not working. She is on disability. She lives in an apartment by herself. No cigarettes or alcohol. She gets caffeine and her aspirin. She is trying to exercise but is very difficult especially because of her legs. Review of systems:  No fevers, chills, sweats, anorexia, weight loss, rash, alopecia, internal inflammation, oral or nasal ulcers, lymphadenopathy.   There is a lymph node in her left upper outer breast area that is being followed with ultrasounds. No shortness of breath or chest pain. She has acid reflux. She has occasional stomach pain after meals. She has occasional nausea and vomiting after eating. She has alternating constipation and diarrhea. No blood in her stools. No trouble urinating. She has occasional dry mouth but no dry eye. No Raynaud's. Occasional migraine headaches, which have gotten better since she gave up Coke. She has severe lower extremity edema. Its fine in the morning but gets worse during the day. It does not go down when she puts her feet up like it used to. Physical exam:  Pleasant woman in no acute distress. She needs help getting up onto the examining table. Blood pressure 120/77, pulse 75, height 5' (1.524 m), weight 182 lb (82.6 kg), last menstrual period 01/07/2022, not currently breastfeeding. She has what looks like psoriasis over her elbows. No alopecia. No conjunctival injection. No nasal oral lesions. No cervical adenopathy. Lungs clear. S1 and S2 regular. Abdomen without hepatosplenomegaly or tenderness. Normal bowel sounds. She has marked pitting edema both distal lower extremities. Cervical spine motion is limited. Shoulder motion is limited bilaterally. She cannot extend either elbow. Wrist motion is limited in flexion and extension right more than left with what seems consistent with synovial thickening. No obvious synovitis across her MCP or PIP joints.  strength is weak. Capillary refill is good in her fingertips. Hip motion is good. Knee flexion is very limited bilaterally with a surgical scar over her left knee. No redness or warmth. Ankle motion is very limited and painful left more than right. The balls of her feet are tender with marked deformity. Assessment and plan:    1. Seronegative rheumatoid arthritis / psoriatic arthritis. She feels that leflunomide helped.   She does not feel that Humira helped, and feels that it upset her stomach and caused other side effects. It is time to restage. Plain x-rays will be done of her right hand, left foot, and knees. Rheumatoid factor and CCP antibody will be repeated, as well as sed rate and QuantiFERON-TB gold test.  Blood counts and chemistries will be done. She will return in 1 week. I will likely be ordering an MRI of her right hand and wrist to document, and will then decide on drug therapy. 2.  Osteoarthritis left knee, status post unicompartmental PF arthroplasty, with osteoarthritis on x-rays in other compartments. Her other knee is also very arthritic. Some surgeons want to replace her left knee. She needs to reverse her obesity. Will consider physical therapy referral. She had bilateral bunion and hammertoe surgeries. She had both hips replaced. 3.  Bipolar disorder, on medications through her psychiatrist.     4.  Diffuse myofascial pain, with nonrestorative sleep and fatigue. Possible irritable bowel syndrome, scheduled to see gastroenterology. Migraine headaches. Thanks for the opportunity to again participate in her care. Sincerely,      Edil Vega MD   Rheumatology.

## 2022-04-21 LAB
M TB IFN-G CD4+ T-CELLS BLD-ACNC: 0.03 IU/ML
M TB TUBERC IFN-G BLD QL: NEGATIVE
M TB TUBERC IGNF/MITOGEN IGNF CONTROL: 2.49 IU/ML
QFT TB1 AG MINUS NIL: 0 IU/ML
QFT TB2 AG MINUS NIL: 0 IU/ML

## 2022-04-22 LAB — CCP IGG SERPL-ACNC: 1.1 U/ML (ref 0–6.9)

## 2022-04-27 ENCOUNTER — PATIENT MESSAGE (OUTPATIENT)
Dept: RHEUMATOLOGY | Facility: CLINIC | Age: 47
End: 2022-04-27

## 2022-04-27 ENCOUNTER — TELEPHONE (OUTPATIENT)
Dept: RHEUMATOLOGY | Facility: CLINIC | Age: 47
End: 2022-04-27

## 2022-04-27 ENCOUNTER — OFFICE VISIT (OUTPATIENT)
Dept: RHEUMATOLOGY | Facility: CLINIC | Age: 47
End: 2022-04-27
Payer: MEDICARE

## 2022-04-27 VITALS
WEIGHT: 182 LBS | BODY MASS INDEX: 35.73 KG/M2 | SYSTOLIC BLOOD PRESSURE: 133 MMHG | HEART RATE: 67 BPM | HEIGHT: 60 IN | DIASTOLIC BLOOD PRESSURE: 81 MMHG

## 2022-04-27 DIAGNOSIS — M79.641 PAIN IN BOTH HANDS: ICD-10-CM

## 2022-04-27 DIAGNOSIS — M79.89 SWELLING OF BOTH HANDS: ICD-10-CM

## 2022-04-27 DIAGNOSIS — M06.4 INFLAMMATORY POLYARTHRITIS (HCC): Primary | ICD-10-CM

## 2022-04-27 DIAGNOSIS — M79.642 PAIN IN BOTH HANDS: ICD-10-CM

## 2022-04-27 DIAGNOSIS — M81.0 AGE-RELATED OSTEOPOROSIS WITHOUT CURRENT PATHOLOGICAL FRACTURE: ICD-10-CM

## 2022-04-27 DIAGNOSIS — M80.00XA AGE-RELATED OSTEOPOROSIS WITH CURRENT PATHOLOGICAL FRACTURE, INITIAL ENCOUNTER: ICD-10-CM

## 2022-04-27 PROCEDURE — 99213 OFFICE O/P EST LOW 20 MIN: CPT | Performed by: INTERNAL MEDICINE

## 2022-04-27 NOTE — TELEPHONE ENCOUNTER
Patient has straight medicare part B. No PA required. Left detailed message for patient to complete an MRI.

## 2022-04-27 NOTE — PROGRESS NOTES
Dear Dr. Kadie Simons,    I saw your patient Marge Smoers in follow-up this morning in my rheumatology clinic. Please see my consult from April 19th. We reviewed her lab results. CMP is normal except calcium of 7.7. Her calcium and has been low in the past.  Her CBC is normal, except white count of 11,400. QuantiFERON-TB gold test is negative. Rheumatoid factor and CCP antibody are negative. Sed rate 17. It is hard for her to get out of bed, and it takes 5 minutes to loosen. Her pain gets worse with activity. This morning her right hand was more inflamed across her MCP and PIP joints. It took 10 to 15 minutes to loosen. Months ago she developed acute severe pain in her left foot. She saw a podiatrist.  That pain has improved. We reviewed her xrays on the computer. Bilateral knee x-rays show osteoarthritis, and postsurgical change in her left patellofemoral joint. Her medial and lateral joint space narrowing appears diffuse. No erosions. Left foot x-rays show a healing subacute fracture deformity of her fifth metatarsal.  Postsurgical changes at her first MTP joint. Hammertoe deformities. Midfoot degenerative change. Right hand x-rays moderate narrowing of her radiocarpal joint space. There seems to be periarticular osteopenia in her wrist and across her MCP joints. No erosions across her MCP joints. Some narrowing of her first and third MCP joints. Physical exam:  Pleasant woman in no acute distress. Blood pressure 133/81, pulse 67, height 5' (1.524 m), weight 182 lb (82.6 kg), last menstrual period 01/07/2022, not currently breastfeeding. Right wrist flexion and extension is very limited with pain. There is a suggestion of swelling across her second and third MCP joints. Otherwise unchanged. Assessment and plan:    1. Seronegative rheumatoid/psoriatic arthritis. An MRI of her right hand will be done to document erosive disease and synovial inflammation.   I will give her a call with her results. Leflunomide helped in the past, so I likely will be starting it back. She likely will need to be on an injectable biologic. She would be a good candidate to come into the office for Cimzia, so was given up-to-date article on Cimzia. 2.  Subacute fracture left fifth metatarsal.  I ordered a DEXA scan. Vitamin D level will need to be checked at some point. 3.  Severe osteoarthritis knees, etc.  She may need a total left knee replacement. 4.  Bipolar disorder, on medications through her psychiatrist.    5.  Diffuse myofascial discomfort, with nonrestorative sleep and fatigue. Possible irritable bowel syndrome. Migraine headaches. Thank you for the opportunity to participate in her care.       Sincerely,      Kiersten Loya MD   Rheumatology

## 2022-04-27 NOTE — TELEPHONE ENCOUNTER
From: Henrique Mckeon  To: Bill Orellana MD  Sent: 4/27/2022 7:59 AM CDT  Subject: Updated Information - meds, doctors, etc    Levothorixne 0.2 MG (200 MCG)   Prescribed by marion Silva MD Endocrinology  [phone]    Bupropion  MG   Topiramate 25 MG  Citalopram 40 MG  Quetiapine 100 MG  Prescribed by Dr. Dora López My Pschyiatrist (zoom appointments every 2 months)  [address] [facility] [address] [facility], [address] [facility]    Deonna [facility] Kimberly Allison  Therapist (talk therapist zoom appointments 2 times a month)  [facility]   [address] [facility] [address] [facility]     Marisol Martines MD Primary Care Doctor [facility]   [address] [place]    I wanted you to have this information Dr. An Ashing any questions please ask. Thanks.  [place]

## 2022-05-03 ENCOUNTER — PATIENT MESSAGE (OUTPATIENT)
Dept: RHEUMATOLOGY | Facility: CLINIC | Age: 47
End: 2022-05-03

## 2022-05-03 NOTE — TELEPHONE ENCOUNTER
From: Jyothi Roberson  To: Devon Rose MD  Sent: 5/3/2022 3:46 PM CDT  Subject: Previous Medications, future medications? You had mentioned a medication called Christina Lam I think Dr. Cody Julien had me on that injection medication for a few months. I don't honestly remember why I stopped the medication. It may have been because of insurance issues. With the Humira pen, my insurance company was only going to cover the cost for so long. And it was very expensive. I stopped the medication because of side effects. I also learned my Aunt had issues with the medication (my mom's side). I also had cortisone injections done in my right hand by Dr. David Johnson a few times a number of years ago. I had pain and at that time it was thought to be a cyst in my right hand. I apologize I didn't mention this during the visit. I honestly didn't remember some of this until after I saw you. And looked back at some of my information. I hope you will still continue to treat me. And I am open to what ever treatment you decide to prescribe in the future. If you need any additional medical records please let me know and I will get   them to you.  Devang Campbell

## 2022-05-04 ENCOUNTER — TELEPHONE (OUTPATIENT)
Dept: RHEUMATOLOGY | Facility: CLINIC | Age: 47
End: 2022-05-04

## 2022-05-04 ENCOUNTER — PATIENT MESSAGE (OUTPATIENT)
Dept: RHEUMATOLOGY | Facility: CLINIC | Age: 47
End: 2022-05-04

## 2022-05-04 NOTE — TELEPHONE ENCOUNTER
From: Mary Chairez  To: Nicko Cruz MD  Sent: 5/4/2022 12:17 PM CDT  Subject: List of Previous Procedures including injections done on my right hand    I wanted you to have the updated list of procedures and injections I had done with my right hand. I was able to download this information from [facility]. If you have any questions please let me know or if you need any additional information I can try and get it for you. My cab is all set for the 23rd for the MRI for my right hand. Thank you again for your help I appreciate it.  Joanie Pruett

## 2022-05-12 ENCOUNTER — PATIENT MESSAGE (OUTPATIENT)
Dept: RHEUMATOLOGY | Facility: CLINIC | Age: 47
End: 2022-05-12

## 2022-05-12 NOTE — TELEPHONE ENCOUNTER
From: To Mcdonald  To: Simeon Damon MD  Sent: 5/12/2022 3:06 PM CDT  Subject: Pain getting worse - right hand arm/shoulder    I know I am scheduled for the MRI on the 23rd. But my pain is increasing with my right hand, arm and shoulder. I am taking over the counter medication to help. But I also started using Bengay Topical Analgesic Cream Ultra Strength helps for a short time. But it doesn't take away the pain completely. I use it on my knees and other areas as well. I've also been using an ice pack, but that doesn't give me much relief. With the warmer weather (humidity) the swelling is also increasing in my legs and other areas. I am hoping you will reconsider and prescribe some kind of water pill or arthritis medication to help. The naproxen generic I started to use doesn't really help. The pain affects my sleep. So this is why I am asking. Thank you.  Williams Schlatter

## 2022-05-23 ENCOUNTER — PATIENT MESSAGE (OUTPATIENT)
Dept: RHEUMATOLOGY | Facility: CLINIC | Age: 47
End: 2022-05-23

## 2022-05-23 NOTE — TELEPHONE ENCOUNTER
From: Gracia Teixeira  To: Connie Deutsch MD  Sent: 5/23/2022 1:08 AM CDT  Subject: Had to reschedule Right Hand MRI - because of allergies and a slight cough    I had to reschedule the right hand MRI. My allergies have been acting up the last few days. After I took a walk on Friday early evening hours. Started having a slight cough and not sure if it's a cold. So to be on the safe side I cancelled the appointment and will call my pcp to see if they want me to get an updated covid 19 test just to be on the safe side. I've been tested before a number of months ago and it was fine. I am sure it's just bad allergies, but I didn't want to take a chance. I will do the MRI for my hand. Just not sure when I will be able to schedule it again. I will try and do that as soon as possible. Thanks for your understanding.  Marge Somers

## 2022-05-25 NOTE — TELEPHONE ENCOUNTER
[facility] contacted and paper work not received, case started over the phone case #[id number] for buy and bill, medical benefit.

## 2022-05-31 ENCOUNTER — PATIENT MESSAGE (OUTPATIENT)
Dept: RHEUMATOLOGY | Facility: CLINIC | Age: 47
End: 2022-05-31

## 2022-06-01 NOTE — TELEPHONE ENCOUNTER
From: Henrique Mckeon  To: Bill Orellana MD  Sent: 5/31/2022 5:53 PM CDT  Subject: Tests rescheduled because of my cold    I had an appointment with my primary care doctor on Monday the 23rd, by video Visit. She wanted me to get an updated Covid 19 test because of my symptoms. I don't have a car so I was not able to go a testing site. I did an at home test and it came back negative. It is just a bad cold. Hopefully my cold will be gone in a few days. Tests are reschedule for June 21 (bone scan) and June 22 (MRI Right Hand). Attached a copy of my test result. If you have any questions or concerns please just ask. Thank you.  Caitlyn Chance

## 2022-06-02 NOTE — TELEPHONE ENCOUNTER
BI summary: Medicare Part B: $233 deductible met and 20% co-insurance. Secondary: [employer] plan covers 100% of the remaining Medicare Part B co-insurance. It does cover the deductible. Okay to schedule patient for Cimzia injections? 400 mg at 0, 2, 4 weeks and then monthly.

## 2022-06-03 NOTE — TELEPHONE ENCOUNTER
Patient is requesting a call back to schedule an appointment towards end of June or early July. Patient is taking antibiotics to treat a sinus infection or bronchitis.  Patient is COVID negative

## 2022-06-03 NOTE — TELEPHONE ENCOUNTER
Pt contacted and 1st Cimzia injection scheduled.       Future Appointments   Date Time Provider Urmila Mitzy   6/21/2022 12:40 PM [facility] DEXA RM1 CF DEXA EM CF   6/22/2022  8:00 AM [address] [place] [address] MRI RM2 (3T WIDE) [facility]   6/28/2022 10:00 AM EC [facility] 2ND FLR RN RHEUM ECLMBRHEUM [facility] [place]

## 2022-06-07 ENCOUNTER — PATIENT MESSAGE (OUTPATIENT)
Dept: RHEUMATOLOGY | Facility: CLINIC | Age: 47
End: 2022-06-07

## 2022-06-07 NOTE — TELEPHONE ENCOUNTER
From: Montez Vazquez  To: Maria De Jesus Lott MD  Sent: 6/7/2022 11:16 AM CDT  Subject: Covid 19 test came back negative    I was feeling better, so I was able to go get an official Covid 19 test. I got it done at the [facility] in [facility] The test came back negative. I am attaching a copy of the test results for you. If you have any question please just ask. Than you.   Rachel Watson

## 2022-06-09 ENCOUNTER — PATIENT MESSAGE (OUTPATIENT)
Dept: RHEUMATOLOGY | Facility: CLINIC | Age: 47
End: 2022-06-09

## 2022-06-10 NOTE — TELEPHONE ENCOUNTER
From: Cruz Singleton  To: Deep Regan MD  Sent: 6/9/2022 9:24 PM CDT  Subject: Bone Density (Dexa Scan) Test Rescheduled    I've had a lot of appointment is June. I had to reschedule my Bone Scan in July. I would have more cab money to use in July to do this appointment. I have I was able to get a ride to get the MRI done on the 22nd and come to do the injection on the 28 of this month. The tests will be done, I just had to reschedule another one. Wednesday July 06, 2022 10:40 AM CDT (40 minutes) [facility]. If you have any questions please let me know. Thanks.  Michelle Freeman

## 2022-06-22 ENCOUNTER — TELEPHONE (OUTPATIENT)
Dept: RHEUMATOLOGY | Facility: CLINIC | Age: 47
End: 2022-06-22

## 2022-06-22 ENCOUNTER — HOSPITAL ENCOUNTER (OUTPATIENT)
Dept: MRI IMAGING | Facility: HOSPITAL | Age: 47
Discharge: HOME | End: 2022-06-22
Attending: INTERNAL MEDICINE
Payer: MEDICARE

## 2022-06-22 DIAGNOSIS — M79.641 PAIN IN BOTH HANDS: ICD-10-CM

## 2022-06-22 DIAGNOSIS — M79.642 PAIN IN BOTH HANDS: ICD-10-CM

## 2022-06-22 DIAGNOSIS — M06.4 INFLAMMATORY POLYARTHRITIS (HCC): ICD-10-CM

## 2022-06-22 DIAGNOSIS — M79.89 SWELLING OF BOTH HANDS: ICD-10-CM

## 2022-06-22 PROCEDURE — A9575 INJ GADOTERATE MEGLUMI 0.1ML: HCPCS | Performed by: INTERNAL MEDICINE

## 2022-06-22 PROCEDURE — 73220 MRI UPPR EXTREMITY W/O&W/DYE: CPT | Performed by: INTERNAL MEDICINE

## 2022-06-27 ENCOUNTER — TELEPHONE (OUTPATIENT)
Dept: RHEUMATOLOGY | Facility: CLINIC | Age: 47
End: 2022-06-27

## 2022-06-27 ENCOUNTER — PATIENT MESSAGE (OUTPATIENT)
Dept: RHEUMATOLOGY | Facility: CLINIC | Age: 47
End: 2022-06-27

## 2022-06-27 NOTE — TELEPHONE ENCOUNTER
I spoke to America concerning her right hand MRI done on June 22nd. She has osseous erosions of her second, third, and fifth finger metacarpal heads, extensive synovitis across all 5 MCP joints, and extensive tenosynovitis of extensor and flexor tendons. She gets her first Cimzia injection tomorrow.   She will schedule follow-up appointment with me in 6 weeks, and hopefully she will be much improved

## 2022-06-28 ENCOUNTER — NURSE ONLY (OUTPATIENT)
Dept: RHEUMATOLOGY | Facility: CLINIC | Age: 47
End: 2022-06-28
Payer: MEDICARE

## 2022-06-28 DIAGNOSIS — M06.9 RHEUMATOID ARTHRITIS, INVOLVING UNSPECIFIED SITE, UNSPECIFIED WHETHER RHEUMATOID FACTOR PRESENT (HCC): Primary | ICD-10-CM

## 2022-06-28 DIAGNOSIS — Z51.81 ENCOUNTER FOR THERAPEUTIC DRUG MONITORING: ICD-10-CM

## 2022-06-28 PROCEDURE — 96372 THER/PROPH/DIAG INJ SC/IM: CPT | Performed by: INTERNAL MEDICINE

## 2022-06-28 NOTE — PROGRESS NOTES
Name and  verified. Pt aware she was to receive injection of Cimza. Injections given and pt tolerated well. Possible local side effects discussed with pt. Education materials provided. Pt aware to follow up in 2 weeks for next injection and to follow up with provider in 6 to 8 weeks after labs completed.

## 2022-07-02 ENCOUNTER — PATIENT MESSAGE (OUTPATIENT)
Dept: RHEUMATOLOGY | Facility: CLINIC | Age: 47
End: 2022-07-02

## 2022-07-05 NOTE — TELEPHONE ENCOUNTER
From: Mary Fernandez  To: Luke Ayala MD  Sent: 7/2/2022 9:20 PM CDT  Subject: Pain left foot - doing okay after first injecton so far    Today I've been experiencing a lot of pain with my left foot again. I put some ice on it and used the bengay cream. I did take the over the counter naproxen to help with the pain as well. I may just be having an arthritis flare up I am feeling it in my knees to. Not sure how soon I will be able to tell if the new medicine Erlinda Bernardino is working or not. But I am still willing to give it a chance and time to work.  America

## 2022-07-06 ENCOUNTER — TELEPHONE (OUTPATIENT)
Dept: RHEUMATOLOGY | Facility: CLINIC | Age: 47
End: 2022-07-06

## 2022-07-06 ENCOUNTER — PATIENT MESSAGE (OUTPATIENT)
Dept: RHEUMATOLOGY | Facility: CLINIC | Age: 47
End: 2022-07-06

## 2022-07-06 ENCOUNTER — HOSPITAL ENCOUNTER (OUTPATIENT)
Dept: BONE DENSITY | Facility: HOSPITAL | Age: 47
Discharge: HOME | End: 2022-07-06
Attending: INTERNAL MEDICINE
Payer: MEDICARE

## 2022-07-06 DIAGNOSIS — M81.0 AGE-RELATED OSTEOPOROSIS WITHOUT CURRENT PATHOLOGICAL FRACTURE: ICD-10-CM

## 2022-07-06 PROCEDURE — 77080 DXA BONE DENSITY AXIAL: CPT | Performed by: INTERNAL MEDICINE

## 2022-07-06 NOTE — TELEPHONE ENCOUNTER
Spoke to patient - she said she wanted to make sure that is how DEXA is done. She was informed it is correct, and if anything different. We will notify her once it has been resulted. She verbalized understanding.

## 2022-07-06 NOTE — TELEPHONE ENCOUNTER
Patient states she did her bone density scan today but she was under the impression that they would do a full body scan but the technicians only scanned her lower back and forearms. Patient would like to know if it will come back accurate. Patient's states she is not happy about how they treated her there. Please advise.

## 2022-07-07 NOTE — TELEPHONE ENCOUNTER
From: Ula Sicard, MD  To: Karina Blackman  Sent: 7/6/2022 6:01 PM CDT  Subject: Bone density scan results. Good news! Jocelyn Alcocer Your bone density is normal.    Take care!

## 2022-07-12 ENCOUNTER — NURSE ONLY (OUTPATIENT)
Dept: RHEUMATOLOGY | Facility: CLINIC | Age: 47
End: 2022-07-12
Payer: MEDICARE

## 2022-07-12 DIAGNOSIS — M06.9 RHEUMATOID ARTHRITIS INVOLVING MULTIPLE SITES, UNSPECIFIED WHETHER RHEUMATOID FACTOR PRESENT (HCC): Primary | ICD-10-CM

## 2022-07-12 PROCEDURE — 96372 THER/PROPH/DIAG INJ SC/IM: CPT | Performed by: INTERNAL MEDICINE

## 2022-07-12 NOTE — PROGRESS NOTES
Name and  verified. Pt aware she was to receive injection of Cimza. Injections given and pt tolerated well. Possible local side effects discussed with pt. Pt did well with 1st injections, reported no site reactions. Pt aware to follow up in 2 weeks for next injection and to follow up with provider in 6 to 8 weeks after labs completed.

## 2022-07-26 ENCOUNTER — LAB ENCOUNTER (OUTPATIENT)
Dept: LAB | Age: 47
End: 2022-07-26
Attending: INTERNAL MEDICINE
Payer: MEDICARE

## 2022-07-26 ENCOUNTER — NURSE ONLY (OUTPATIENT)
Dept: RHEUMATOLOGY | Facility: CLINIC | Age: 47
End: 2022-07-26
Payer: MEDICARE

## 2022-07-26 DIAGNOSIS — M06.9 RHEUMATOID ARTHRITIS, INVOLVING UNSPECIFIED SITE, UNSPECIFIED WHETHER RHEUMATOID FACTOR PRESENT (HCC): ICD-10-CM

## 2022-07-26 DIAGNOSIS — Z51.81 ENCOUNTER FOR THERAPEUTIC DRUG MONITORING: ICD-10-CM

## 2022-07-26 DIAGNOSIS — M06.09 RHEUMATOID ARTHRITIS OF MULTIPLE SITES WITH NEGATIVE RHEUMATOID FACTOR (HCC): Primary | ICD-10-CM

## 2022-07-26 LAB
ALBUMIN SERPL-MCNC: 3.5 G/DL (ref 3.4–5)
AST SERPL-CCNC: 12 U/L (ref 15–37)
BASOPHILS # BLD AUTO: 0.09 X10(3) UL (ref 0–0.2)
BASOPHILS NFR BLD AUTO: 0.9 %
CREAT BLD-MCNC: 0.71 MG/DL
CRP SERPL-MCNC: <0.29 MG/DL (ref ?–0.3)
DEPRECATED RDW RBC AUTO: 47.5 FL (ref 35.1–46.3)
EOSINOPHIL # BLD AUTO: 0.49 X10(3) UL (ref 0–0.7)
EOSINOPHIL NFR BLD AUTO: 5.1 %
ERYTHROCYTE [DISTWIDTH] IN BLOOD BY AUTOMATED COUNT: 13.5 % (ref 11–15)
ERYTHROCYTE [SEDIMENTATION RATE] IN BLOOD: 10 MM/HR
HCT VFR BLD AUTO: 38.3 %
HGB BLD-MCNC: 12.4 G/DL
IMM GRANULOCYTES # BLD AUTO: 0.04 X10(3) UL (ref 0–1)
IMM GRANULOCYTES NFR BLD: 0.4 %
LYMPHOCYTES # BLD AUTO: 2.34 X10(3) UL (ref 1–4)
LYMPHOCYTES NFR BLD AUTO: 24.2 %
MCH RBC QN AUTO: 30.8 PG (ref 26–34)
MCHC RBC AUTO-ENTMCNC: 32.4 G/DL (ref 31–37)
MCV RBC AUTO: 95.3 FL
MONOCYTES # BLD AUTO: 1.3 X10(3) UL (ref 0.1–1)
MONOCYTES NFR BLD AUTO: 13.5 %
NEUTROPHILS # BLD AUTO: 5.4 X10 (3) UL (ref 1.5–7.7)
NEUTROPHILS # BLD AUTO: 5.4 X10(3) UL (ref 1.5–7.7)
NEUTROPHILS NFR BLD AUTO: 55.9 %
PLATELET # BLD AUTO: 179 10(3)UL (ref 150–450)
RBC # BLD AUTO: 4.02 X10(6)UL
WBC # BLD AUTO: 9.7 X10(3) UL (ref 4–11)

## 2022-07-26 PROCEDURE — 85652 RBC SED RATE AUTOMATED: CPT

## 2022-07-26 PROCEDURE — 82565 ASSAY OF CREATININE: CPT

## 2022-07-26 PROCEDURE — 84450 TRANSFERASE (AST) (SGOT): CPT

## 2022-07-26 PROCEDURE — 85025 COMPLETE CBC W/AUTO DIFF WBC: CPT

## 2022-07-26 PROCEDURE — 36415 COLL VENOUS BLD VENIPUNCTURE: CPT

## 2022-07-26 PROCEDURE — 86140 C-REACTIVE PROTEIN: CPT

## 2022-07-26 PROCEDURE — 96372 THER/PROPH/DIAG INJ SC/IM: CPT | Performed by: INTERNAL MEDICINE

## 2022-07-26 PROCEDURE — 82040 ASSAY OF SERUM ALBUMIN: CPT

## 2022-07-26 NOTE — PROGRESS NOTES
Name and  verified. Pt aware she was to receive injection of Cimza. Injections given in Left and Right Lower Abdomen, and pt tolerated well. Possible local side effects discussed with pt. Pt aware to follow up in 4 weeks for next injection.

## 2022-07-27 ENCOUNTER — TELEPHONE (OUTPATIENT)
Dept: RHEUMATOLOGY | Facility: CLINIC | Age: 47
End: 2022-07-27

## 2022-07-27 NOTE — TELEPHONE ENCOUNTER
[facility] message sent to change appt date for Cimzia Injection so it will be given within 4 weeks.

## 2022-07-31 ENCOUNTER — PATIENT MESSAGE (OUTPATIENT)
Dept: RHEUMATOLOGY | Facility: CLINIC | Age: 47
End: 2022-07-31

## 2022-08-01 ENCOUNTER — TELEPHONE (OUTPATIENT)
Dept: RHEUMATOLOGY | Facility: CLINIC | Age: 47
End: 2022-08-01

## 2022-08-01 NOTE — TELEPHONE ENCOUNTER
From: Rolan Pagan  To: Eldridge Canavan, MD  Sent: 7/31/2022 11:18 AM CDT  Subject: Side effects from [facility] injection    I wanted to let you know I am experiencing a few side effects from the medication I believe. However, I still think it is worth continuing. After the injection I am usually sore and very tired. Not very hungry and I've had some stomach issues. After the last injection I started to get some bruising on the right side. I included a picture for you. 2 of me laying down and one with me sitting up. I am sure it will go away after another few days. Despite the side effects I still plan on taking the medicine. Anything additional that you can prescribe besides the injection to help the arthritis pain?  Thanks America

## 2022-08-01 NOTE — TELEPHONE ENCOUNTER
Regarding: Side effects from [facility] injection  ----- Message from Fredrick Andrade RN sent at 8/1/2022  2:34 PM CDT -----  Please advise.      ----- Message from Yvonne Sykes to Jonas Keita MD sent at 7/31/2022 11:18 AM -----   I wanted to let you know I am experiencing a few side effects from the medication I believe. However, I still think it is worth continuing. After the injection I am usually sore and very tired. Not very hungry and I've had some stomach issues. After the last injection I started to get some bruising on the right side. I included a picture for you. 2 of me laying down and one with me sitting up. I am sure it will go away after another few days. Despite the side effects I still plan on taking the medicine. Anything additional that you can prescribe besides the injection to help the arthritis pain?  Thanks America

## 2022-08-01 NOTE — TELEPHONE ENCOUNTER
I left a message. Unfortunately she did not answer. I will send her a [facility] message.     Her follow-up with me is scheduled next week

## 2022-08-04 NOTE — PROGRESS NOTES
From: To Mcdonald  To: Simeon Damon MD  Sent: 7/31/2022 11:18 AM CDT  Subject: Side effects from [facility] injection    I wanted to let you know I am experiencing a few side effects from the medication I believe. However, I still think it is worth continuing. After the injection I am usually sore and very tired. Not very hungry and I've had some stomach issues. After the last injection I started to get some bruising on the right side. I included a picture for you. 2 of me laying down and one with me sitting up. I am sure it will go away after another few days. Despite the side effects I still plan on taking the medicine. Anything additional that you can prescribe besides the injection to help the arthritis pain?  Thanks America

## 2022-08-09 ENCOUNTER — OFFICE VISIT (OUTPATIENT)
Dept: RHEUMATOLOGY | Facility: CLINIC | Age: 47
End: 2022-08-09
Payer: MEDICARE

## 2022-08-09 VITALS
DIASTOLIC BLOOD PRESSURE: 80 MMHG | SYSTOLIC BLOOD PRESSURE: 123 MMHG | HEART RATE: 70 BPM | HEIGHT: 60 IN | BODY MASS INDEX: 34.55 KG/M2 | WEIGHT: 176 LBS

## 2022-08-09 DIAGNOSIS — Z51.81 THERAPEUTIC DRUG MONITORING: ICD-10-CM

## 2022-08-09 DIAGNOSIS — M06.09 RHEUMATOID ARTHRITIS OF MULTIPLE SITES WITH NEGATIVE RHEUMATOID FACTOR (HCC): Primary | ICD-10-CM

## 2022-08-09 DIAGNOSIS — M15.9 OSTEOARTHRITIS OF MULTIPLE JOINTS, UNSPECIFIED OSTEOARTHRITIS TYPE: ICD-10-CM

## 2022-08-09 DIAGNOSIS — M81.0 AGE-RELATED OSTEOPOROSIS WITHOUT CURRENT PATHOLOGICAL FRACTURE: ICD-10-CM

## 2022-08-09 PROCEDURE — 99214 OFFICE O/P EST MOD 30 MIN: CPT | Performed by: INTERNAL MEDICINE

## 2022-08-09 RX ORDER — BUPROPION HYDROCHLORIDE 150 MG/1
150 TABLET ORAL EVERY MORNING
COMMUNITY
Start: 2022-07-30

## 2022-08-09 RX ORDER — LEFLUNOMIDE 20 MG/1
TABLET ORAL
Qty: 30 TABLET | Refills: 5 | Status: SHIPPED | OUTPATIENT
Start: 2022-08-09

## 2022-08-22 ENCOUNTER — TELEPHONE (OUTPATIENT)
Dept: RHEUMATOLOGY | Facility: CLINIC | Age: 47
End: 2022-08-22

## 2022-08-22 ENCOUNTER — PATIENT MESSAGE (OUTPATIENT)
Dept: RHEUMATOLOGY | Facility: CLINIC | Age: 47
End: 2022-08-22

## 2022-08-22 NOTE — TELEPHONE ENCOUNTER
Spoke to patient - she just having a foot pain and unable to come this week. Appt changed to next week.      Future Appointments   Date Time Provider Urmila Mitzy   8/30/2022 11:00 AM KAI GILMAN 2ND FLR RN [facility] [place]   10/11/2022 11:00 AM Ina Schlatter, MD [facility] [place]

## 2022-08-22 NOTE — TELEPHONE ENCOUNTER
From: Johnnie Tate  To: Alejandra Magaña MD  Sent: 8/22/2022 11:53 AM CDT  Subject: Cimzia Inection Need to reschedule Foot Pain and Allergies acting up    I called to reschedule my Cimzia injection for tomorrow at 11 am. I am having an arthritis flare up, pain with my left foot and my allergies are acting up. I was at [facility] yesterday for a Religion event and it was humid out. My allergies were triggered because of the trees, flowers and other stuff. It was humid out, the swelling went down after I put my feet up. I am out of my allergy medicine and getting more so aftter I take it I will be better - benadryl. I am hoping to be able to reschedule the injection for next Tuesday at 11 am. I am having a hard time walking on my left foot right now. I've been icing it and resting. Thank you for your understanding. Just waiting for the nurse to call me to reschedule the appointment.  Carmel Teague

## 2022-08-22 NOTE — TELEPHONE ENCOUNTER
Patient is requesting a call to reschedule her Cimzia injection. Patient is sick and requesting an appointment for next week.

## 2022-08-30 ENCOUNTER — NURSE ONLY (OUTPATIENT)
Dept: RHEUMATOLOGY | Facility: CLINIC | Age: 47
End: 2022-08-30
Payer: MEDICARE

## 2022-08-30 DIAGNOSIS — M06.09 RHEUMATOID ARTHRITIS OF MULTIPLE SITES WITH NEGATIVE RHEUMATOID FACTOR (HCC): Primary | ICD-10-CM

## 2022-08-30 PROCEDURE — 99211 OFF/OP EST MAY X REQ PHY/QHP: CPT | Performed by: INTERNAL MEDICINE

## 2022-09-02 ENCOUNTER — PATIENT MESSAGE (OUTPATIENT)
Dept: RHEUMATOLOGY | Facility: CLINIC | Age: 47
End: 2022-09-02

## 2022-09-02 NOTE — TELEPHONE ENCOUNTER
Please let her know that it looks like it should be okay. She needs to keep a close eye on it. Thank you.

## 2022-09-02 NOTE — TELEPHONE ENCOUNTER
From: Yane Done  To: Attila Garcia MD  Sent: 9/2/2022 1:08 PM CDT  Subject: Ceclia Frock on stomach after injection     I am a little more sore in that area a d it's gotten much darker now kind of like a blood blister. I wanted you to know. Doing okay though.  America

## 2022-09-06 ENCOUNTER — PATIENT MESSAGE (OUTPATIENT)
Dept: RHEUMATOLOGY | Facility: CLINIC | Age: 47
End: 2022-09-06

## 2022-09-07 NOTE — TELEPHONE ENCOUNTER
From: Victor Manuel Martinez  To: Reuben José MD  Sent: 9/6/2022 7:07 PM CDT  Subject: Updated Vaccination Record    Attached an updated copy of my Covid 19 Vaccination Record. If you have any questions please ask. I received the 4th Covid 19 vaccination on September 2.   Anila Hallman

## 2022-09-11 ENCOUNTER — PATIENT MESSAGE (OUTPATIENT)
Dept: RHEUMATOLOGY | Facility: CLINIC | Age: 47
End: 2022-09-11

## 2022-09-12 NOTE — TELEPHONE ENCOUNTER
From: Cruz Singleton  To: Deep Regan MD  Sent: 9/6/2022 7:07 PM CDT  Subject: Updated Vaccination Record    Attached an updated copy of my Covid 19 Vaccination Record. If you have any questions please ask. I received the 4th Covid 19 vaccination on September 2.   Michelle Freeman

## 2022-09-27 ENCOUNTER — LAB ENCOUNTER (OUTPATIENT)
Dept: LAB | Age: 47
End: 2022-09-27
Attending: INTERNAL MEDICINE
Payer: MEDICARE

## 2022-09-27 ENCOUNTER — NURSE ONLY (OUTPATIENT)
Dept: RHEUMATOLOGY | Facility: CLINIC | Age: 47
End: 2022-09-27

## 2022-09-27 DIAGNOSIS — Z51.81 ENCOUNTER FOR THERAPEUTIC DRUG MONITORING: ICD-10-CM

## 2022-09-27 DIAGNOSIS — M06.9 RHEUMATOID ARTHRITIS, INVOLVING UNSPECIFIED SITE, UNSPECIFIED WHETHER RHEUMATOID FACTOR PRESENT (HCC): ICD-10-CM

## 2022-09-27 DIAGNOSIS — M06.09 RHEUMATOID ARTHRITIS OF MULTIPLE SITES WITH NEGATIVE RHEUMATOID FACTOR (HCC): Primary | ICD-10-CM

## 2022-09-27 LAB
ALBUMIN SERPL-MCNC: 3.6 G/DL (ref 3.4–5)
AST SERPL-CCNC: 30 U/L (ref 15–37)
BASOPHILS # BLD AUTO: 0.14 X10(3) UL (ref 0–0.2)
BASOPHILS NFR BLD AUTO: 1.5 %
CREAT BLD-MCNC: 0.62 MG/DL
CRP SERPL-MCNC: <0.29 MG/DL (ref ?–0.3)
DEPRECATED RDW RBC AUTO: 44.1 FL (ref 35.1–46.3)
EOSINOPHIL # BLD AUTO: 0.4 X10(3) UL (ref 0–0.7)
EOSINOPHIL NFR BLD AUTO: 4.2 %
ERYTHROCYTE [DISTWIDTH] IN BLOOD BY AUTOMATED COUNT: 12.6 % (ref 11–15)
ERYTHROCYTE [SEDIMENTATION RATE] IN BLOOD: 14 MM/HR
GFR SERPLBLD BASED ON 1.73 SQ M-ARVRAT: 110 ML/MIN/1.73M2 (ref 60–?)
HCT VFR BLD AUTO: 39.5 %
HGB BLD-MCNC: 12.8 G/DL
IMM GRANULOCYTES # BLD AUTO: 0.03 X10(3) UL (ref 0–1)
IMM GRANULOCYTES NFR BLD: 0.3 %
LYMPHOCYTES # BLD AUTO: 2.43 X10(3) UL (ref 1–4)
LYMPHOCYTES NFR BLD AUTO: 25.5 %
MCH RBC QN AUTO: 30.8 PG (ref 26–34)
MCHC RBC AUTO-ENTMCNC: 32.4 G/DL (ref 31–37)
MCV RBC AUTO: 95.2 FL
MONOCYTES # BLD AUTO: 1.35 X10(3) UL (ref 0.1–1)
MONOCYTES NFR BLD AUTO: 14.2 %
NEUTROPHILS # BLD AUTO: 5.18 X10 (3) UL (ref 1.5–7.7)
NEUTROPHILS # BLD AUTO: 5.18 X10(3) UL (ref 1.5–7.7)
NEUTROPHILS NFR BLD AUTO: 54.3 %
PLATELET # BLD AUTO: 171 10(3)UL (ref 150–450)
RBC # BLD AUTO: 4.15 X10(6)UL
WBC # BLD AUTO: 9.5 X10(3) UL (ref 4–11)

## 2022-09-27 PROCEDURE — 96372 THER/PROPH/DIAG INJ SC/IM: CPT | Performed by: INTERNAL MEDICINE

## 2022-09-27 PROCEDURE — 86140 C-REACTIVE PROTEIN: CPT

## 2022-09-27 PROCEDURE — 84450 TRANSFERASE (AST) (SGOT): CPT

## 2022-09-27 PROCEDURE — 82040 ASSAY OF SERUM ALBUMIN: CPT

## 2022-09-27 PROCEDURE — 85652 RBC SED RATE AUTOMATED: CPT

## 2022-09-27 PROCEDURE — 85025 COMPLETE CBC W/AUTO DIFF WBC: CPT

## 2022-09-27 PROCEDURE — 82565 ASSAY OF CREATININE: CPT

## 2022-09-27 PROCEDURE — 36415 COLL VENOUS BLD VENIPUNCTURE: CPT

## 2022-10-09 NOTE — PROGRESS NOTES
Mina Guaman is a 26-year-old woman with long history of seronegative rheumatoid/psoriatic arthritis. An MRI of her right hand June 22nd of 2022, showed osseous erosions involving her index, middle, and small finger metacarpal heads,  extensive synovitis of all 5 MCP joints, extensive tenosynovitis fourth and fifth extensor compartments, and flexor digitorum tendon sheaths and flexor pollicis longus tendon sheath. She therefore was started on Cimzia injections, and has continued them monthly. She is tolerating it well. She continues to get fatigued. She has been able to get on the train and bus, which is an improvement. Her knees, left greater than right hurt. She had a partial left knee replacement already. She is last saw her orthopedist a year ago. She has considered a total knee replacement. He has been on leflunomide since August 9th of 2022, and is tolerating it. She has noticed less inflammation in her wrists and hands. Monitoring labs were done 9/27/2022. CBC was normal, except monocytes of 1350. Creatinine, AST, and albumin normal.  Sed rate was 14, and C-reactive protein less than 0.29. Before medication, her C-reactive protein had been 2.29. She has not had infections. She had a subacute fracture of her left fifth metatarsal.  A DEXA scan was done on July 6th, 2022, and was normal.    Review of Systems:   Constitutional: Positive for fatigue. Negative for fever. Respiratory: Negative for shortness of breath. Cardiovascular: Negative for chest pain. Gastrointestinal: Negative for abdominal pain. Skin: Negative for rash. Hematological: Negative for adenopathy. Allergies:  Cat Dander [Dander]       Seasonal                  Lamictal [Lamotrigi*    RASH    Comment:LEFT shoulder and chest - rash             NO anyphylaxis. Physical Exam:  Pleasant woman in no acute distress.   She needs help getting up onto the examining table, largely because of her left knee.  HENT:      Head: Normocephalic. Eyes:      Pupils: Pupils are equal, round, and reactive to light. Cardiovascular:      Rate and Rhythm: Normal rate and regular rhythm. Pulses: Normal pulses. Heart sounds: Normal heart sounds. Pulmonary:      Effort: Pulmonary effort is normal.      Breath sounds: Normal breath sounds. Abdominal:      General: Bowel sounds are normal.      Tenderness: There is no abdominal tenderness. Musculoskeletal:      Comments: No obvious synovial thickening of her wrists.  strength is fair to good. Knee flexion is limited and painful bilaterally, left much more than right. Skin:     Findings: No rash. Neurological:      Mental Status: She is alert and oriented to person, place, and time. Assessment & Plan:     1. Destructive seronegative rheumatoid/psoriatic arthritis. Documented by MRI of her right hand June of 2022, which confirmed erosive disease and active synovial inflammation. Cimzia and leflunomide are controlling her inflammation. She will continue with 400 mg of Cimzia every 4 weeks in the office, and leflunomide 20 mg a day. She will schedule follow-up in 3 months, after repeat monitoring labs. 2.  Subacute fracture left fifth metatarsal.  DEXA scan normal July of 2022. Calcium and vitamin D.    3.  Severe osteoarthritis knees, etc.  She may need a total left knee replacement. 4.  Bipolar disorder, on medications through her psychiatrist.    5.  Diffuse myofascial discomfort, with nonrestorative sleep and fatigue. Possible irritable bowel syndrome. Migraine headaches.

## 2022-10-11 ENCOUNTER — OFFICE VISIT (OUTPATIENT)
Dept: RHEUMATOLOGY | Facility: CLINIC | Age: 47
End: 2022-10-11
Payer: MEDICARE

## 2022-10-11 VITALS
BODY MASS INDEX: 34.16 KG/M2 | DIASTOLIC BLOOD PRESSURE: 74 MMHG | HEIGHT: 60 IN | SYSTOLIC BLOOD PRESSURE: 107 MMHG | HEART RATE: 88 BPM | WEIGHT: 174 LBS

## 2022-10-11 DIAGNOSIS — M06.09 RHEUMATOID ARTHRITIS OF MULTIPLE SITES WITH NEGATIVE RHEUMATOID FACTOR (HCC): Primary | ICD-10-CM

## 2022-10-11 DIAGNOSIS — Z51.81 THERAPEUTIC DRUG MONITORING: ICD-10-CM

## 2022-10-11 DIAGNOSIS — M81.0 AGE-RELATED OSTEOPOROSIS WITHOUT CURRENT PATHOLOGICAL FRACTURE: ICD-10-CM

## 2022-10-11 PROCEDURE — 99214 OFFICE O/P EST MOD 30 MIN: CPT | Performed by: INTERNAL MEDICINE

## 2022-10-11 RX ORDER — LEVOTHYROXINE SODIUM 0.2 MG/1
200 TABLET ORAL DAILY
COMMUNITY
Start: 2022-09-06

## 2022-10-11 RX ORDER — LEVOTHYROXINE SODIUM 0.03 MG/1
0.5 TABLET ORAL DAILY
COMMUNITY
Start: 2022-09-03

## 2022-10-12 ENCOUNTER — PATIENT MESSAGE (OUTPATIENT)
Dept: RHEUMATOLOGY | Facility: CLINIC | Age: 47
End: 2022-10-12

## 2022-10-12 DIAGNOSIS — M06.09 RHEUMATOID ARTHRITIS OF MULTIPLE SITES WITH NEGATIVE RHEUMATOID FACTOR (HCC): Primary | ICD-10-CM

## 2022-10-12 DIAGNOSIS — M15.9 OSTEOARTHRITIS OF MULTIPLE JOINTS, UNSPECIFIED OSTEOARTHRITIS TYPE: ICD-10-CM

## 2022-10-12 NOTE — TELEPHONE ENCOUNTER
From: To Mcdonald  To: Simeon Damon MD  Sent: 10/12/2022 12:13 PM CDT  Subject: Can I try More Physical Therapy before a Knee Replacement? I know you are recommending a left knee replacement, however, I've had so many surgeries. ..3 all ready on my left knee. Could I try Physical Therapy again just to see if it would help? Are you able to put a referral in and an order for it? The Orthopedic Dr Surgeon I saw the last time was named Estelle MORRISgissidu [phone] phone [phone] My scheduled appointment with him is on November 9 at 2:30 pm. I would appreciate if you would send him your notes and past x rays you've done on my knees. Thank you.  Williams Schlatter denies

## 2022-10-12 NOTE — TELEPHONE ENCOUNTER
Please send my clinic notes and past x-rays results of her knees to her surgeon. Please let her know that I would be glad to refer her to physical therapy. What physical therapy department would she be going to? Would it be [facility], or outside of our system. Would the [place] physical therapy department work for her? Once I know, I will write the order. Thank you.

## 2022-10-12 NOTE — TELEPHONE ENCOUNTER
Physical therapy order produced for 12 visits, at the [facility]. Please let patient know, and help her get the order. Thank you.

## 2022-10-19 ENCOUNTER — PATIENT MESSAGE (OUTPATIENT)
Dept: RHEUMATOLOGY | Facility: CLINIC | Age: 47
End: 2022-10-19

## 2022-10-20 NOTE — TELEPHONE ENCOUNTER
From: Reza Cooper  To: Lu Castle MD  Sent: 10/19/2022 4:40 PM CDT  Subject: physical therapy    Dr Argueta Roles,  I received the physical therapy referral, but it's unclear if my insurance company will cover the cost. And it's very difficult to get a hold of anyone at the [place] [id number]. I've tried 2 times now. I may just wait until I see my Orthopedic Surgeon in November and see what he wants me to do. I can't afford right now to accumulate medical bills. So I will try and do some exercises on my own. Thanks for your understanding. And I am not sure about the left knee replacement. I will wait and see what he wants me to do. But I've had so many surgeries, I don't know if I want to go through all that again. If I do decide to do it and he does to, I would want to wait until Spring time and see how I am feeling then. Thanks for your understanding. I plan on continuing your treatment plan with the medicine.  Cherelle Rodriguez
Noted.
Please see pt's message below.
Male

## 2022-10-25 ENCOUNTER — NURSE ONLY (OUTPATIENT)
Dept: RHEUMATOLOGY | Facility: CLINIC | Age: 47
End: 2022-10-25
Payer: MEDICARE

## 2022-10-25 DIAGNOSIS — M06.09 RHEUMATOID ARTHRITIS OF MULTIPLE SITES WITHOUT RHEUMATOID FACTOR (HCC): Primary | ICD-10-CM

## 2022-10-25 PROCEDURE — 96372 THER/PROPH/DIAG INJ SC/IM: CPT | Performed by: INTERNAL MEDICINE

## 2022-11-20 ENCOUNTER — PATIENT MESSAGE (OUTPATIENT)
Dept: RHEUMATOLOGY | Facility: CLINIC | Age: 47
End: 2022-11-20

## 2022-11-21 ENCOUNTER — PATIENT MESSAGE (OUTPATIENT)
Dept: RHEUMATOLOGY | Facility: CLINIC | Age: 47
End: 2022-11-21

## 2022-11-21 ENCOUNTER — TELEPHONE (OUTPATIENT)
Dept: RHEUMATOLOGY | Facility: CLINIC | Age: 47
End: 2022-11-21

## 2022-11-21 NOTE — TELEPHONE ENCOUNTER
Please see [employer] message from yesterday, states she remembers having an appt for her Cimzia injection but no appointment done. Asking to speak to a nurse to reschedule appointment.

## 2022-11-21 NOTE — TELEPHONE ENCOUNTER
From: Victor Manuel Martinez  To: Reuben José MD  Sent: 11/20/2022 6:09 PM CST  Subject: Cizmia Injection    I thought I had an appointment scheduled for November 22 to come in for my monthly injection. I noticed it's not on the schedule. It was originally scheduled for Tuesday I think either at 10 am or 11 am. If I don't schedule it for this month then I will miss my once a month injection. If the doctor is okay with that because of the holiday that is fine. I can't come in on November 29 because it's my first day of physical therapy and that would be to much. I could come in on November 30. I've attached a copy of my appointments. I am glad I doubled checked because Ithought I scheduled this appointment during my last injection and no one contacted me to let me know it was canceled or changed. Please advise on what you would like me to do. Thank you. Please note I would need to take the train to get to the appointment and to take the train 2 days before a holiday would be a lot. So it would be better if I could wait until after the holiday.  Thanks Anila Hallman

## 2022-11-21 NOTE — TELEPHONE ENCOUNTER
Pt contacted and appointment scheduled.        Future Appointments   Date Time Provider Urmila Forrester   11/22/2022 11:00 AM EC MUKUL 2ND FLR RN RHEUM ECLMBRHEUM  [place]   1/10/2023 11:20 AM Makayla Edwards MD [facility] [place]

## 2022-11-21 NOTE — TELEPHONE ENCOUNTER
Pt contacted and scheduled for appointment with nurse.       Future Appointments   Date Time Provider Urmila Forrester   11/22/2022 11:00 AM EC ABNERB 2ND FLR [facility]   1/10/2023 11:20 AM Masha Morgan MD [facility] [place]

## 2022-11-21 NOTE — TELEPHONE ENCOUNTER
Pt contacted and issue addressed.       Future Appointments   Date Time Provider Urmila Forrester   11/22/2022 11:00 AM EC ABNERB 2ND FLR RN RHEUM BRENT  [place]   1/10/2023 11:20 AM Jessica Day MD [facility] [place]

## 2022-11-21 NOTE — TELEPHONE ENCOUNTER
From: Tammy Ryan  To: Darya Godinez MD  Sent: 11/21/2022 8:27 AM CST  Subject: Cimzia Injection - scheduling confusion    At my last appointment I know I scheduled an appointment for the 22 of this month. Now the appointment is saying it was never scheduled. I called this morning and a nurse will need to call me back to schedule this appointment. I will most likely miss the November injection not done intentionally. I think there was a scheduling issue. I remember speaking to someone before I left to schedule this appointment. I have no issues rescheduling the appointment. I just don't want you to be upset with me Dr. Veronica Romero for missing my injection this month when it wasn't my fault. Weather permitting, I could come 12/8/22 or 12/15/22 (both Tuesdays) to get the injection done. I start Physical Therapy at [facility] on November 29. Thank you for your help with this issue.  Sharyle Sevin

## 2022-11-22 ENCOUNTER — NURSE ONLY (OUTPATIENT)
Dept: RHEUMATOLOGY | Facility: CLINIC | Age: 47
End: 2022-11-22
Payer: MEDICARE

## 2022-11-22 DIAGNOSIS — M06.09 RHEUMATOID ARTHRITIS OF MULTIPLE SITES WITHOUT RHEUMATOID FACTOR (HCC): Primary | ICD-10-CM

## 2022-11-22 PROCEDURE — 96372 THER/PROPH/DIAG INJ SC/IM: CPT | Performed by: INTERNAL MEDICINE

## 2022-11-22 NOTE — PROGRESS NOTES
Name and  verified. Pt aware she was to receive injections of Cimza. Injections given in Left and Right Lower Abdomen, and pt tolerated well. Possible local side effects discussed with pt. Pt aware to follow up in 4  weeks for next injection.

## 2022-11-25 ENCOUNTER — PATIENT MESSAGE (OUTPATIENT)
Dept: RHEUMATOLOGY | Facility: CLINIC | Age: 47
End: 2022-11-25

## 2022-11-28 NOTE — TELEPHONE ENCOUNTER
From: Montez Vazquez  To: Maria De Jesus Lott MD  Sent: 11/25/2022 8:30 PM CST  Subject: Covid 19 test Result    When I came to the office for my injection on Tuesday I was fine. On Wed my allergies started acting up. On Thursday I had a cough and stuffy nose. Friday, a friend brought an at home Covid 19 test and it came back positive. My apologies if I had known sooner I would have stayed home. Kaylah was my nurse who gave me the injection that day. I've been contacting people I was in direct contact with in the last week. I have a video visit with my primary care doctor on Tuesday to find out what to do next. In the mean time I am not going anywhere and resting. Hoping to get an updated covid 19 test done at the clinic when my doctor says it's okay for me to do so. I attached a copy of my at home test and my vaccination record. Stay safe and healthy.

## 2022-12-03 ENCOUNTER — PATIENT MESSAGE (OUTPATIENT)
Dept: RHEUMATOLOGY | Facility: CLINIC | Age: 47
End: 2022-12-03

## 2022-12-04 ENCOUNTER — PATIENT MESSAGE (OUTPATIENT)
Dept: RHEUMATOLOGY | Facility: CLINIC | Age: 47
End: 2022-12-04

## 2022-12-05 NOTE — TELEPHONE ENCOUNTER
From: Larry Trevizo  To: John Brand MD  Sent: 12/4/2022 5:12 PM CST  Subject: Injection and Positive Covid 19 test    I recently had a test done at the clinic for Covid 19 and it came back positive. Am I still going to be able to come and get my injection done for Cizmia on December 20? I also need to get blood work done for you as well can I get that done on the same day before the injection? I downloaded a copy of the test result for you to have. I am vaccinated. I was also informed Covid 19 can be detected on a test, but it doesn't mean you are contagious. I do have a k94 mask to use. Thank you.

## 2022-12-05 NOTE — TELEPHONE ENCOUNTER
From: Sunil Reilly  To: Darlin Fair MD  Sent: 12/3/2022 10:31 AM CST  Subject: Updated Covid 19 Test negative    Updated Covid 19 test came back negative today. I've attached a copy of the test results for you. Have a good day.  Atanacio Klinefelter

## 2022-12-16 ENCOUNTER — PATIENT MESSAGE (OUTPATIENT)
Dept: RHEUMATOLOGY | Facility: CLINIC | Age: 47
End: 2022-12-16

## 2022-12-16 NOTE — TELEPHONE ENCOUNTER
From: Derek Martinez  To: Erika Germain MD  Sent: 12/16/2022 2:58 PM CST  Subject: [place] Flake my flu shot Can I still get my injection? I got my flu shot a few days ago. Is it still okay for me to come and get my injection done on the 20th? I've attached the record of it. I can't get my updated Covid 19 booster for a few months now. My last one was in September of 2022. Also I am feeling much better then I was a week ago. But still plan on using my k94 mask when I come to get the injection. fyi: my arthritis symptoms have increased since the change of weather again. Still taking the leflunomide.  Juliet Grant

## 2022-12-20 ENCOUNTER — NURSE ONLY (OUTPATIENT)
Dept: RHEUMATOLOGY | Facility: CLINIC | Age: 47
End: 2022-12-20
Payer: MEDICARE

## 2022-12-20 ENCOUNTER — LAB ENCOUNTER (OUTPATIENT)
Dept: LAB | Age: 47
End: 2022-12-20
Attending: INTERNAL MEDICINE
Payer: MEDICARE

## 2022-12-20 DIAGNOSIS — M06.09 RHEUMATOID ARTHRITIS OF MULTIPLE SITES WITHOUT RHEUMATOID FACTOR (HCC): Primary | ICD-10-CM

## 2022-12-20 DIAGNOSIS — M06.9 RHEUMATOID ARTHRITIS, INVOLVING UNSPECIFIED SITE, UNSPECIFIED WHETHER RHEUMATOID FACTOR PRESENT (HCC): ICD-10-CM

## 2022-12-20 DIAGNOSIS — Z51.81 ENCOUNTER FOR THERAPEUTIC DRUG MONITORING: ICD-10-CM

## 2022-12-20 LAB
ALBUMIN SERPL-MCNC: 3.2 G/DL (ref 3.4–5)
AST SERPL-CCNC: 25 U/L (ref 15–37)
BASOPHILS # BLD AUTO: 0.09 X10(3) UL (ref 0–0.2)
BASOPHILS NFR BLD AUTO: 1 %
CREAT BLD-MCNC: 0.73 MG/DL
CRP SERPL-MCNC: <0.29 MG/DL (ref ?–0.3)
DEPRECATED RDW RBC AUTO: 47.3 FL (ref 35.1–46.3)
EOSINOPHIL # BLD AUTO: 0.59 X10(3) UL (ref 0–0.7)
EOSINOPHIL NFR BLD AUTO: 6.7 %
ERYTHROCYTE [DISTWIDTH] IN BLOOD BY AUTOMATED COUNT: 13.4 % (ref 11–15)
ERYTHROCYTE [SEDIMENTATION RATE] IN BLOOD: 9 MM/HR
GFR SERPLBLD BASED ON 1.73 SQ M-ARVRAT: 101 ML/MIN/1.73M2 (ref 60–?)
HCT VFR BLD AUTO: 38.4 %
HGB BLD-MCNC: 12.3 G/DL
IMM GRANULOCYTES # BLD AUTO: 0.04 X10(3) UL (ref 0–1)
IMM GRANULOCYTES NFR BLD: 0.5 %
LYMPHOCYTES # BLD AUTO: 2.1 X10(3) UL (ref 1–4)
LYMPHOCYTES NFR BLD AUTO: 23.9 %
MCH RBC QN AUTO: 30.8 PG (ref 26–34)
MCHC RBC AUTO-ENTMCNC: 32 G/DL (ref 31–37)
MCV RBC AUTO: 96.2 FL
MONOCYTES # BLD AUTO: 1.27 X10(3) UL (ref 0.1–1)
MONOCYTES NFR BLD AUTO: 14.4 %
NEUTROPHILS # BLD AUTO: 4.71 X10 (3) UL (ref 1.5–7.7)
NEUTROPHILS # BLD AUTO: 4.71 X10(3) UL (ref 1.5–7.7)
NEUTROPHILS NFR BLD AUTO: 53.5 %
PLATELET # BLD AUTO: 130 10(3)UL (ref 150–450)
RBC # BLD AUTO: 3.99 X10(6)UL
WBC # BLD AUTO: 8.8 X10(3) UL (ref 4–11)

## 2022-12-20 PROCEDURE — 36415 COLL VENOUS BLD VENIPUNCTURE: CPT

## 2022-12-20 PROCEDURE — 82565 ASSAY OF CREATININE: CPT

## 2022-12-20 PROCEDURE — 86140 C-REACTIVE PROTEIN: CPT

## 2022-12-20 PROCEDURE — 85025 COMPLETE CBC W/AUTO DIFF WBC: CPT

## 2022-12-20 PROCEDURE — 82040 ASSAY OF SERUM ALBUMIN: CPT

## 2022-12-20 PROCEDURE — 84450 TRANSFERASE (AST) (SGOT): CPT

## 2022-12-20 PROCEDURE — 96372 THER/PROPH/DIAG INJ SC/IM: CPT | Performed by: INTERNAL MEDICINE

## 2022-12-20 PROCEDURE — 85652 RBC SED RATE AUTOMATED: CPT

## 2022-12-20 NOTE — PROGRESS NOTES
Name and  verified. Pt aware she was to receive injection of Cimza. Injections given and pt tolerated well. Jay Nieves Pt aware to follow up in 4 weeks for next injection. Pt reported she completed her labs today. She was advised provider will review labs once completed.

## 2023-01-05 NOTE — PROGRESS NOTES
Roxie Monique is a 43-year-old woman with long history of seronegative rheumatoid/psoriatic arthritis. An MRI of her right hand June 22nd of 2022, showed osseous erosions involving her index, middle, and small finger metacarpal heads,  extensive synovitis of all 5 MCP joints, extensive tenosynovitis fourth and fifth extensor compartments, and flexor digitorum tendon sheaths and flexor pollicis longus tendon sheath. She therefore was started on Cimzia injections, and has continued them monthly. She is tolerating it well. There has definitely been improvement. Her knees, left greater than right hurt. She had a partial left knee replacement already. She f/u scheduled with her orthopedist, and may get a total knee replacement this coming summer. He has been on leflunomide since August 9th of 2022, and is tolerating it. She has noticed less inflammation in her wrists and hands. Monitoring labs were done 12/20/2022. CBC showed borderline platelets, of 314,916. Creatinine and AST normal. Albumin 3.2. Sed rate was 9, and C-reactive protein less than 0.29. Before medication, her C-reactive protein had been 2.29. She recovered well from COVID infection late November of 2022. Review of Systems:   Constitutional: Positive for fatigue. Negative for fever. Respiratory: Negative for shortness of breath. Cardiovascular: Negative for chest pain. Gastrointestinal: Negative for abdominal pain. Skin: Negative for rash. Hematological: Negative for adenopathy. Allergies:  Cat Dander [Dander]       Seasonal                  Lamictal [Lamotrigi*    RASH    Comment:LEFT shoulder and chest - rash             NO anyphylaxis. Physical Exam:  Pleasant woman in no acute distress. She can get up onto the examining table on her own. HENT:      Head: Normocephalic. Eyes:      Pupils: Pupils are equal, round, and reactive to light.    Cardiovascular:      Rate and Rhythm: Normal rate and regular rhythm. Pulses: Normal pulses. Heart sounds: Normal heart sounds. Pulmonary:      Effort: Pulmonary effort is normal.      Breath sounds: Normal breath sounds. Abdominal:      General: Bowel sounds are normal.      Tenderness: There is no abdominal tenderness. Musculoskeletal:      Comments: No obvious synovial thickening of her wrists.  strength is fair to good. Knee flexion is limited and painful bilaterally, left much more than right. Skin:     Findings: No rash. Neurological:      Mental Status: She is alert and oriented to person, place, and time. Assessment & Plan:     1. Destructive seronegative rheumatoid/psoriatic arthritis. Documented by MRI of her right hand June of 2022, which confirmed erosive disease and active synovial inflammation. Cimzia and leflunomide are controlling her inflammation. She will continue with 400 mg of Cimzia every 4 weeks in the office, and leflunomide 20 mg a day. She will schedule follow-up in 3 months, after repeat monitoring labs. 2.  Subacute fracture left fifth metatarsal.  DEXA scan normal July of 2022. Calcium and vitamin D.    3.  Severe osteoarthritis knees, etc.  She may need a total left knee replacement. 4.  Bipolar disorder, on medications through her psychiatrist.    5.  Diffuse myofascial discomfort, with nonrestorative sleep and fatigue. Possible irritable bowel syndrome. Migraine headaches. 6.  Borderline low platelet count. Will recheck in 3 months.

## 2023-01-10 ENCOUNTER — OFFICE VISIT (OUTPATIENT)
Dept: RHEUMATOLOGY | Facility: CLINIC | Age: 48
End: 2023-01-10
Payer: MEDICARE

## 2023-01-10 VITALS
SYSTOLIC BLOOD PRESSURE: 124 MMHG | BODY MASS INDEX: 34.16 KG/M2 | WEIGHT: 174 LBS | HEIGHT: 60 IN | HEART RATE: 83 BPM | DIASTOLIC BLOOD PRESSURE: 81 MMHG

## 2023-01-10 DIAGNOSIS — M81.0 AGE-RELATED OSTEOPOROSIS WITHOUT CURRENT PATHOLOGICAL FRACTURE: ICD-10-CM

## 2023-01-10 DIAGNOSIS — M15.9 OSTEOARTHRITIS OF MULTIPLE JOINTS, UNSPECIFIED OSTEOARTHRITIS TYPE: ICD-10-CM

## 2023-01-10 DIAGNOSIS — M06.09 RHEUMATOID ARTHRITIS OF MULTIPLE SITES WITHOUT RHEUMATOID FACTOR (HCC): Primary | ICD-10-CM

## 2023-01-10 PROCEDURE — 99214 OFFICE O/P EST MOD 30 MIN: CPT | Performed by: INTERNAL MEDICINE

## 2023-01-10 RX ORDER — LEFLUNOMIDE 20 MG/1
TABLET ORAL
Qty: 90 TABLET | Refills: 1 | Status: SHIPPED | OUTPATIENT
Start: 2023-01-10

## 2023-01-11 ENCOUNTER — TELEPHONE (OUTPATIENT)
Dept: RHEUMATOLOGY | Facility: CLINIC | Age: 48
End: 2023-01-11

## 2023-01-11 ENCOUNTER — PATIENT MESSAGE (OUTPATIENT)
Dept: RHEUMATOLOGY | Facility: CLINIC | Age: 48
End: 2023-01-11

## 2023-01-11 NOTE — TELEPHONE ENCOUNTER
Appointment rescheduled.    Future Appointments   Date Time Provider Urmila Forrester   1/24/2023 11:00 AM [facility] 2ND FLR RN [facility]  [place]   4/12/2023 11:00 AM Masha Morgan MD [facility] [place] [facility] [place]

## 2023-01-12 NOTE — TELEPHONE ENCOUNTER
Pt rescheduled.       Future Appointments   Date Time Provider Urmila Mitzy   1/24/2023 11:00 AM [facility] 2ND FLR RN [facility] [place]   4/12/2023 11:00 AM Cleveland Boston MD [facility] [place] [facility] [place]

## 2023-01-12 NOTE — TELEPHONE ENCOUNTER
From: Robin Decree  To: Liam Bess MD  Sent: 1/11/2023 3:32 PM CST  Subject: Cimzia Injection Need to reschedule    I need to reschedule my Cimzia injection. I cancelled the appointment for Jan 17. Can you reschedule it for Jan 24 or the 24 around 11 am? I had to schedule a Mammogram and Ultrasound for that day. Thank you for your understanding.  Chelsea Curtis

## 2023-01-18 ENCOUNTER — PATIENT MESSAGE (OUTPATIENT)
Dept: RHEUMATOLOGY | Facility: CLINIC | Age: 48
End: 2023-01-18

## 2023-01-18 NOTE — TELEPHONE ENCOUNTER
From: Rolan Pagan  Sent: 1/18/2023 10:22 AM CST  To: María Jim Clinical Staff  Subject: Orthopedic notes. You are welcome. And thanks. Ad long as you gave a copy of it. I am seeing him today at 1:15 pm. I will get it when I come in for my next injection. Thanks.  Alycia Finn

## 2023-01-24 ENCOUNTER — NURSE ONLY (OUTPATIENT)
Dept: RHEUMATOLOGY | Facility: CLINIC | Age: 48
End: 2023-01-24

## 2023-01-24 DIAGNOSIS — M06.09 RHEUMATOID ARTHRITIS OF MULTIPLE SITES WITHOUT RHEUMATOID FACTOR (HCC): Primary | ICD-10-CM

## 2023-01-24 PROCEDURE — 96372 THER/PROPH/DIAG INJ SC/IM: CPT | Performed by: INTERNAL MEDICINE

## 2023-01-24 NOTE — PROGRESS NOTES
Name and  verified. Pt aware she was to receive Cimza injections. Injections given in bilateral lower abdomen and pt tolerated well. Pt aware to follow up in 4 weeks for next injection.

## 2023-01-31 ENCOUNTER — TELEPHONE (OUTPATIENT)
Dept: RHEUMATOLOGY | Facility: CLINIC | Age: 48
End: 2023-01-31

## 2023-01-31 NOTE — TELEPHONE ENCOUNTER
Pt contacted and appointment scheduled.        Future Appointments   Date Time Provider Urmila Forrester   2/21/2023 11:00 AM [facility] 2ND FLR RN [facility]  [place]   4/12/2023 11:00 AM Oswaldo Richmond MD [facility] [place] [facility] [place]

## 2023-02-18 ENCOUNTER — PATIENT MESSAGE (OUTPATIENT)
Dept: RHEUMATOLOGY | Facility: CLINIC | Age: 48
End: 2023-02-18

## 2023-02-19 NOTE — TELEPHONE ENCOUNTER
From: Lynette Vargas  To: Lissette Meadows MD  Sent: 2/18/2023 5:06 PM CST  Subject: Need to Re-schedule Cimzia Injection    Alex Chicas and Dr. Edy Sims,  My allergies have been acting up the last few days. Not sure if it's a cold or not. To be on the safe side I am going to reschedule my Cimzia injection. Hopefully my symptoms will improve by the 28th of Feb so I won't miss this months injection. I just don't want to get your staff sick if I do have a cold. Thanks for your understanding. I will call to reschedule on Monday.  Gio Georges [phone]

## 2023-03-02 ENCOUNTER — TELEPHONE (OUTPATIENT)
Dept: RHEUMATOLOGY | Facility: CLINIC | Age: 48
End: 2023-03-02

## 2023-03-02 NOTE — TELEPHONE ENCOUNTER
Pt contacted and appointment scheduled.       Future Appointments   Date Time Provider Urmila Forrester   3/14/2023 11:00 AM [facility] 2ND FLR RN [facility] [place]   4/12/2023 11:00 AM Nolan Swift MD [facility] [place] [facility] [place]

## 2023-03-14 ENCOUNTER — LAB ENCOUNTER (OUTPATIENT)
Dept: LAB | Age: 48
End: 2023-03-14
Attending: INTERNAL MEDICINE
Payer: MEDICARE

## 2023-03-14 ENCOUNTER — NURSE ONLY (OUTPATIENT)
Dept: RHEUMATOLOGY | Facility: CLINIC | Age: 48
End: 2023-03-14

## 2023-03-14 DIAGNOSIS — M06.09 RHEUMATOID ARTHRITIS OF MULTIPLE SITES WITHOUT RHEUMATOID FACTOR (HCC): Primary | ICD-10-CM

## 2023-03-14 DIAGNOSIS — Z51.81 ENCOUNTER FOR THERAPEUTIC DRUG MONITORING: ICD-10-CM

## 2023-03-14 DIAGNOSIS — M06.9 RHEUMATOID ARTHRITIS, INVOLVING UNSPECIFIED SITE, UNSPECIFIED WHETHER RHEUMATOID FACTOR PRESENT (HCC): ICD-10-CM

## 2023-03-14 LAB
ALBUMIN SERPL-MCNC: 3.6 G/DL (ref 3.4–5)
AST SERPL-CCNC: 20 U/L (ref 15–37)
BASOPHILS # BLD AUTO: 0.12 X10(3) UL (ref 0–0.2)
BASOPHILS NFR BLD AUTO: 1.2 %
CREAT BLD-MCNC: 0.74 MG/DL
CRP SERPL-MCNC: <0.29 MG/DL (ref ?–0.3)
DEPRECATED RDW RBC AUTO: 42.9 FL (ref 35.1–46.3)
EOSINOPHIL # BLD AUTO: 0.61 X10(3) UL (ref 0–0.7)
EOSINOPHIL NFR BLD AUTO: 6.1 %
ERYTHROCYTE [DISTWIDTH] IN BLOOD BY AUTOMATED COUNT: 12.5 % (ref 11–15)
ERYTHROCYTE [SEDIMENTATION RATE] IN BLOOD: 12 MM/HR
GFR SERPLBLD BASED ON 1.73 SQ M-ARVRAT: 100 ML/MIN/1.73M2 (ref 60–?)
HCT VFR BLD AUTO: 39.4 %
HGB BLD-MCNC: 13.1 G/DL
IMM GRANULOCYTES # BLD AUTO: 0.04 X10(3) UL (ref 0–1)
IMM GRANULOCYTES NFR BLD: 0.4 %
LYMPHOCYTES # BLD AUTO: 2.08 X10(3) UL (ref 1–4)
LYMPHOCYTES NFR BLD AUTO: 20.8 %
MCH RBC QN AUTO: 31.3 PG (ref 26–34)
MCHC RBC AUTO-ENTMCNC: 33.2 G/DL (ref 31–37)
MCV RBC AUTO: 94.3 FL
MONOCYTES # BLD AUTO: 1.29 X10(3) UL (ref 0.1–1)
MONOCYTES NFR BLD AUTO: 12.9 %
NEUTROPHILS # BLD AUTO: 5.84 X10 (3) UL (ref 1.5–7.7)
NEUTROPHILS # BLD AUTO: 5.84 X10(3) UL (ref 1.5–7.7)
NEUTROPHILS NFR BLD AUTO: 58.6 %
PLATELET # BLD AUTO: 170 10(3)UL (ref 150–450)
RBC # BLD AUTO: 4.18 X10(6)UL
WBC # BLD AUTO: 10 X10(3) UL (ref 4–11)

## 2023-03-14 PROCEDURE — 86140 C-REACTIVE PROTEIN: CPT

## 2023-03-14 PROCEDURE — 82565 ASSAY OF CREATININE: CPT

## 2023-03-14 PROCEDURE — 85652 RBC SED RATE AUTOMATED: CPT

## 2023-03-14 PROCEDURE — 96372 THER/PROPH/DIAG INJ SC/IM: CPT | Performed by: INTERNAL MEDICINE

## 2023-03-14 PROCEDURE — 85025 COMPLETE CBC W/AUTO DIFF WBC: CPT

## 2023-03-14 PROCEDURE — 84450 TRANSFERASE (AST) (SGOT): CPT

## 2023-03-14 PROCEDURE — 82040 ASSAY OF SERUM ALBUMIN: CPT

## 2023-03-14 PROCEDURE — 36415 COLL VENOUS BLD VENIPUNCTURE: CPT

## 2023-03-17 ENCOUNTER — PATIENT MESSAGE (OUTPATIENT)
Dept: RHEUMATOLOGY | Facility: CLINIC | Age: 48
End: 2023-03-17

## 2023-03-20 NOTE — TELEPHONE ENCOUNTER
From: Darlin Fair MD  To: Sunil Reilly  Sent: 3/17/2023 2:38 PM CDT  Subject: Lab results. Your blood counts, kidney function test, liver tests, and inflammation markers are normal.    I trust that you are doing well. I will plan to see you back as scheduled on April 12th. Take care!

## 2023-04-06 NOTE — PROGRESS NOTES
Orville Ricketts is a 50-year-old woman with long history of seronegative rheumatoid/psoriatic arthritis. An MRI of her right hand June 22nd of 2022, showed osseous erosions involving her index, middle, and small finger metacarpal heads,  extensive synovitis of all 5 MCP joints, extensive tenosynovitis fourth and fifth extensor compartments, and flexor digitorum tendon sheaths and flexor pollicis longus tendon sheath. She therefore was started on Cimzia injections, and has continued them monthly. She is tolerating it well. There has definitely been improvement. Her knees, left greater than right hurt. She had a partial left knee replacement already. She f/u scheduled with her orthopedist, and may get a total knee replacement. He has been on leflunomide since August 9th of 2022, and is tolerating it. She has noticed less inflammation in her wrists and hands. Monitoring labs were done 3/14/2023. CBC, creatinine, AST, albumin 3.2, sed rate of 12, and C-reactive protein less than 0.29 were all normal.     She had what was felt to be a severe migraine several months ago. Since then she has had twitching of her hands and legs. Dr. Cherelle Skinner is her neurologist.  Had MRI was fine. Cervical spine MRI showed mild spondylitic changes with multilevel mild to moderate neuroforaminal stenoses, most pronounced at C3-4 on the left side and C5-6 and C6-7 bilaterally. No changes of multiple sclerosis. Review of Systems:   Constitutional: Positive for fatigue. Negative for fever. Respiratory: Negative for shortness of breath. Cardiovascular: Negative for chest pain. Gastrointestinal: Negative for abdominal pain. Skin: Negative for rash. Hematological: Negative for adenopathy. Allergies:  Cat Dander [Dander]       Seasonal                  Lamictal [Lamotrigi*    RASH    Comment:LEFT shoulder and chest - rash             NO anyphylaxis. Physical Exam:  Pleasant woman in no acute distress.   She can get up onto the examining table on her own. Blood pressure 117/78, pulse 73, height 5' (1.524 m), weight 176 lb (79.8 kg), not currently breastfeeding. HENT:      Head: Normocephalic. Eyes:      Pupils: Pupils are equal, round, and reactive to light. Cardiovascular:      Rate and Rhythm: Normal rate and regular rhythm. Pulses: Normal pulses. Heart sounds: Normal heart sounds. Pulmonary:      Effort: Pulmonary effort is normal.      Breath sounds: Normal breath sounds. Abdominal:      General: Bowel sounds are normal.      Tenderness: There is no abdominal tenderness. Musculoskeletal:      Comments: No obvious synovial thickening of her wrists.  strength is fair to good. Knee flexion is limited and painful bilaterally, left much more than right. Skin:     Findings: No rash. Neurological:      Mental Status: She is alert and oriented to person, place, and time. Assessment & Plan:     1. Destructive seronegative rheumatoid/psoriatic arthritis. Documented by MRI of her right hand June of 2022, which confirmed erosive disease and active synovial inflammation. Cimzia and leflunomide are controlling her inflammation. She will continue with 400 mg of Cimzia every 4 weeks in the office, and leflunomide 20 mg a day. She will schedule follow-up in 2 months, after repeat monitoring labs. 2.  Subacute fracture left fifth metatarsal.  DEXA scan normal July of 2022. Calcium and vitamin D.    3.  Severe osteoarthritis knees, etc.  She may need a total left knee replacement. 4.  Bipolar disorder, on medications through her psychiatrist.    5.  Diffuse myofascial discomfort, with nonrestorative sleep and fatigue. Possible irritable bowel syndrome. Migraine headaches. 6.  Tremor. Neurologic work-up negative for multiple sclerosis. To follow-up with neurology.

## 2023-04-12 ENCOUNTER — PATIENT MESSAGE (OUTPATIENT)
Dept: RHEUMATOLOGY | Facility: CLINIC | Age: 48
End: 2023-04-12

## 2023-04-12 ENCOUNTER — OFFICE VISIT (OUTPATIENT)
Dept: RHEUMATOLOGY | Facility: CLINIC | Age: 48
End: 2023-04-12

## 2023-04-12 VITALS
SYSTOLIC BLOOD PRESSURE: 117 MMHG | WEIGHT: 176 LBS | DIASTOLIC BLOOD PRESSURE: 78 MMHG | HEART RATE: 73 BPM | BODY MASS INDEX: 34.55 KG/M2 | HEIGHT: 60 IN

## 2023-04-12 DIAGNOSIS — M81.0 AGE-RELATED OSTEOPOROSIS WITHOUT CURRENT PATHOLOGICAL FRACTURE: ICD-10-CM

## 2023-04-12 DIAGNOSIS — M15.9 OSTEOARTHRITIS OF MULTIPLE JOINTS, UNSPECIFIED OSTEOARTHRITIS TYPE: ICD-10-CM

## 2023-04-12 DIAGNOSIS — M06.09 RHEUMATOID ARTHRITIS OF MULTIPLE SITES WITHOUT RHEUMATOID FACTOR (HCC): Primary | ICD-10-CM

## 2023-04-12 DIAGNOSIS — R25.1 TREMOR: ICD-10-CM

## 2023-04-12 DIAGNOSIS — Z51.81 ENCOUNTER FOR THERAPEUTIC DRUG MONITORING: ICD-10-CM

## 2023-04-12 PROCEDURE — 96372 THER/PROPH/DIAG INJ SC/IM: CPT | Performed by: INTERNAL MEDICINE

## 2023-04-12 PROCEDURE — 99214 OFFICE O/P EST MOD 30 MIN: CPT | Performed by: INTERNAL MEDICINE

## 2023-04-12 RX ORDER — HYDROXYZINE HYDROCHLORIDE 25 MG/1
25 TABLET, FILM COATED ORAL 2 TIMES DAILY PRN
COMMUNITY
Start: 2023-03-22

## 2023-04-12 RX ORDER — TOPIRAMATE 50 MG/1
50 TABLET, FILM COATED ORAL DAILY
COMMUNITY
Start: 2023-03-22

## 2023-04-12 NOTE — PROGRESS NOTES
Name and  verified. Pt aware she was to receive injection of Cimza. Injections given in Left and Right Lower Abdomen,and pt tolerated well. Possible local side effects discussed with pt. Pt aware to follow up in 4 weeks for next injection.

## 2023-04-12 NOTE — TELEPHONE ENCOUNTER
From: Sherrilyn Harada  To: Tracy Salomon MD  Sent: 4/12/2023 8:05 AM CDT  Subject: Arthritis Flare Up - MRI done on my Neck and Brain    Dr. Ragini Harden,  I have an appointment with you today at 11 am. But I wanted to give you a heads up that I've been having an arthritis flare up. In my back, lower back and other areas. I am hoping that after getting the injection today after the appointment with you it will help. One of my doctors did increase a medication of mine Bupropion (Wellbutrin) to 300 mg on April 7th. I can discuss more of the symptoms with you when I come in today. Note: also I had an MRI done on my Brain and Neck recently. I've attached a copy of the results. However you should be able to access the information. It was done by [facility]. I went to see him for Migrane headaches and other issues I was having. See you in few hours.  Pop Ignacio

## 2023-05-16 ENCOUNTER — PATIENT MESSAGE (OUTPATIENT)
Dept: RHEUMATOLOGY | Facility: CLINIC | Age: 48
End: 2023-05-16

## 2023-05-16 NOTE — TELEPHONE ENCOUNTER
From: Diane Luis  To: Mary Bacon MD  Sent: 5/16/2023 2:38 PM CDT  Subject: I finally scheduled the total left knee replacement    I wanted to let you know I finally scheduled the total left knee replacement surgery. They were scheduling 3 months out. So it won't be until after you retire. I am ready to do it now. It's scheduled for August 24 at [facility] in [facility]. With the same Orthopedic Doctor Surgeon you've been in contact with. I still have my appointment with you in June. See you then.  Riky Yang

## 2023-05-17 ENCOUNTER — NURSE ONLY (OUTPATIENT)
Dept: RHEUMATOLOGY | Facility: CLINIC | Age: 48
End: 2023-05-17

## 2023-05-17 DIAGNOSIS — M06.09 RHEUMATOID ARTHRITIS OF MULTIPLE SITES WITHOUT RHEUMATOID FACTOR (HCC): Primary | ICD-10-CM

## 2023-05-17 PROCEDURE — 96372 THER/PROPH/DIAG INJ SC/IM: CPT | Performed by: INTERNAL MEDICINE

## 2023-05-29 ENCOUNTER — PATIENT MESSAGE (OUTPATIENT)
Dept: RHEUMATOLOGY | Facility: CLINIC | Age: 48
End: 2023-05-29

## 2023-05-30 ENCOUNTER — TELEPHONE (OUTPATIENT)
Dept: RHEUMATOLOGY | Facility: CLINIC | Age: 48
End: 2023-05-30

## 2023-05-30 NOTE — TELEPHONE ENCOUNTER
Pt contacted and advised she can have Cimiza injection at follow up appointment with provider on 6/13/2023. Pt verbalized understanding.      Future Appointments   Date Time Provider Urmila Forrester   6/13/2023 12:00 PM Curry Barnes MD [address]

## 2023-05-30 NOTE — TELEPHONE ENCOUNTER
From: Aguilar Fox  To: Migdalia Trejo MD  Sent: 5/29/2023 1:52 PM CDT  Subject: Letter regarding Need of Walker and Thank you. Hi Dr Barbara Camarena,  Thank you for the letter and signing the [facility] ID paperwork needed. I appreciate it. However with the   Letter you sent by [facility], I actually need a signed  letter. I have to take that with me to the [facility] to update and renew my Disabled State ID. If you could please mail that to me aftter it's been signed I would appreciate it. I really wish you were not retiring, but I understand. You've been in practice for a long time. I thank you very much for how you've helped me. It's made a difference thank you.    Sincerely,  Bienvenido Garcia

## 2023-06-08 NOTE — PROGRESS NOTES
Agata Garcia is a 79-year-old woman with long history of seronegative rheumatoid/psoriatic arthritis. An MRI of her right hand June 22nd of 2022, showed osseous erosions involving her index, middle, and small finger metacarpal heads,  extensive synovitis of all 5 MCP joints, extensive tenosynovitis fourth and fifth extensor compartments, and flexor digitorum tendon sheaths and flexor pollicis longus tendon sheath. She therefore was started on Cimzia injections, and has continued them monthly. She is tolerating it well. There has definitely been improvement. Her knees, left greater than right hurt. She had a partial left knee replacement already. Left total knee replacement is scheduled 8/24/2023. He has been on leflunomide since August 9th of 2022, and is tolerating it. She has noticed less inflammation in her wrists and hands. Monitoring labs were redrawn today. Results are last available from 3/14/2023. CBC, creatinine, AST, albumin 3.2, sed rate of 12, and C-reactive protein less than 0.29 were all normal.     Review of Systems:   Constitutional: Positive for fatigue. Negative for fever. Respiratory: Negative for shortness of breath. Cardiovascular: Negative for chest pain. Gastrointestinal: Negative for abdominal pain. Skin: Negative for rash. Hematological: Negative for adenopathy. Allergies:  Cat Dander [Dander]       Seasonal                  Lamictal [Lamotrigi*    RASH    Comment:LEFT shoulder and chest - rash             NO anyphylaxis. Physical Exam:  Pleasant woman in no acute distress. She can get up onto the examining table on her own. Blood pressure 126/74, pulse 73, height 5' (1.524 m), weight 176 lb (79.8 kg), not currently breastfeeding. HENT:      Head: Normocephalic. Eyes:      Pupils: Pupils are equal, round, and reactive to light. Cardiovascular:      Rate and Rhythm: Normal rate and regular rhythm. Pulses: Normal pulses.       Heart sounds: Normal heart sounds. Pulmonary:      Effort: Pulmonary effort is normal.      Breath sounds: Normal breath sounds. Abdominal:      General: Bowel sounds are normal.      Tenderness: There is no abdominal tenderness. Musculoskeletal:      Comments: No obvious synovial thickening of her wrists.  strength is fair to good. Knee flexion is limited and painful bilaterally, left much more than right. Skin:     Findings: No rash. Neurological:      Mental Status: She is alert and oriented to person, place, and time. Assessment & Plan:     1. Destructive seronegative rheumatoid/psoriatic arthritis. Documented by MRI of her right hand June of 2022, which confirmed erosive disease and active synovial inflammation. Cimzia and leflunomide are controlling her inflammation. She will continue with 400 mg of Cimzia every 4 weeks in the office, and leflunomide 20 mg a day. Her left knee is being replaced August 24th, so she will skip her August Cimzia injection. She will schedule follow-up in Rheumatology in 3 months. Monitoring labs were drawn today. 2.  Subacute fracture left fifth metatarsal.  DEXA scan normal July of 2022. Calcium and vitamin D.    3.  Severe osteoarthritis knees, etc. Left knee replacement scheduled. 4.  Bipolar disorder, on medications through her psychiatrist.    5.  Diffuse myofascial discomfort, with nonrestorative sleep and fatigue. Possible irritable bowel syndrome. Migraine headaches. 6.  Tremor. Neurologic work-up negative for multiple sclerosis.

## 2023-06-13 ENCOUNTER — LAB ENCOUNTER (OUTPATIENT)
Dept: LAB | Age: 48
End: 2023-06-13
Attending: INTERNAL MEDICINE
Payer: MEDICARE

## 2023-06-13 ENCOUNTER — OFFICE VISIT (OUTPATIENT)
Dept: RHEUMATOLOGY | Facility: CLINIC | Age: 48
End: 2023-06-13

## 2023-06-13 VITALS
HEART RATE: 73 BPM | HEIGHT: 60 IN | DIASTOLIC BLOOD PRESSURE: 74 MMHG | SYSTOLIC BLOOD PRESSURE: 126 MMHG | BODY MASS INDEX: 34.55 KG/M2 | WEIGHT: 176 LBS

## 2023-06-13 DIAGNOSIS — Z51.81 ENCOUNTER FOR THERAPEUTIC DRUG MONITORING: ICD-10-CM

## 2023-06-13 DIAGNOSIS — M81.0 AGE-RELATED OSTEOPOROSIS WITHOUT CURRENT PATHOLOGICAL FRACTURE: ICD-10-CM

## 2023-06-13 DIAGNOSIS — M06.09 RHEUMATOID ARTHRITIS OF MULTIPLE SITES WITHOUT RHEUMATOID FACTOR (HCC): Primary | ICD-10-CM

## 2023-06-13 DIAGNOSIS — M15.9 OSTEOARTHRITIS OF MULTIPLE JOINTS, UNSPECIFIED OSTEOARTHRITIS TYPE: ICD-10-CM

## 2023-06-13 DIAGNOSIS — M06.9 RHEUMATOID ARTHRITIS, INVOLVING UNSPECIFIED SITE, UNSPECIFIED WHETHER RHEUMATOID FACTOR PRESENT (HCC): ICD-10-CM

## 2023-06-13 LAB
ALBUMIN SERPL-MCNC: 3.7 G/DL (ref 3.4–5)
AST SERPL-CCNC: 16 U/L (ref 15–37)
BASOPHILS # BLD AUTO: 0.1 X10(3) UL (ref 0–0.2)
BASOPHILS NFR BLD AUTO: 1.2 %
CREAT BLD-MCNC: 0.66 MG/DL
CRP SERPL-MCNC: <0.29 MG/DL (ref ?–0.3)
DEPRECATED RDW RBC AUTO: 43.1 FL (ref 35.1–46.3)
EOSINOPHIL # BLD AUTO: 0.49 X10(3) UL (ref 0–0.7)
EOSINOPHIL NFR BLD AUTO: 5.9 %
ERYTHROCYTE [DISTWIDTH] IN BLOOD BY AUTOMATED COUNT: 12.8 % (ref 11–15)
ERYTHROCYTE [SEDIMENTATION RATE] IN BLOOD: 11 MM/HR
GFR SERPLBLD BASED ON 1.73 SQ M-ARVRAT: 108 ML/MIN/1.73M2 (ref 60–?)
HCT VFR BLD AUTO: 38.8 %
HGB BLD-MCNC: 12.9 G/DL
IMM GRANULOCYTES # BLD AUTO: 0.03 X10(3) UL (ref 0–1)
IMM GRANULOCYTES NFR BLD: 0.4 %
LYMPHOCYTES # BLD AUTO: 1.77 X10(3) UL (ref 1–4)
LYMPHOCYTES NFR BLD AUTO: 21.2 %
MCH RBC QN AUTO: 30.6 PG (ref 26–34)
MCHC RBC AUTO-ENTMCNC: 33.2 G/DL (ref 31–37)
MCV RBC AUTO: 92.2 FL
MONOCYTES # BLD AUTO: 1.02 X10(3) UL (ref 0.1–1)
MONOCYTES NFR BLD AUTO: 12.2 %
NEUTROPHILS # BLD AUTO: 4.92 X10 (3) UL (ref 1.5–7.7)
NEUTROPHILS # BLD AUTO: 4.92 X10(3) UL (ref 1.5–7.7)
NEUTROPHILS NFR BLD AUTO: 59.1 %
PLATELET # BLD AUTO: 161 10(3)UL (ref 150–450)
RBC # BLD AUTO: 4.21 X10(6)UL
WBC # BLD AUTO: 8.3 X10(3) UL (ref 4–11)

## 2023-06-13 PROCEDURE — 85652 RBC SED RATE AUTOMATED: CPT

## 2023-06-13 PROCEDURE — 82565 ASSAY OF CREATININE: CPT

## 2023-06-13 PROCEDURE — 85025 COMPLETE CBC W/AUTO DIFF WBC: CPT

## 2023-06-13 PROCEDURE — 96372 THER/PROPH/DIAG INJ SC/IM: CPT | Performed by: INTERNAL MEDICINE

## 2023-06-13 PROCEDURE — 36415 COLL VENOUS BLD VENIPUNCTURE: CPT

## 2023-06-13 PROCEDURE — 86140 C-REACTIVE PROTEIN: CPT

## 2023-06-13 PROCEDURE — 82040 ASSAY OF SERUM ALBUMIN: CPT

## 2023-06-13 PROCEDURE — 84450 TRANSFERASE (AST) (SGOT): CPT

## 2023-06-13 PROCEDURE — 99214 OFFICE O/P EST MOD 30 MIN: CPT | Performed by: INTERNAL MEDICINE

## 2023-06-13 RX ORDER — LEFLUNOMIDE 20 MG/1
TABLET ORAL
Qty: 90 TABLET | Refills: 1 | Status: SHIPPED | OUTPATIENT
Start: 2023-06-13

## 2023-06-15 ENCOUNTER — PATIENT MESSAGE (OUTPATIENT)
Dept: RHEUMATOLOGY | Facility: CLINIC | Age: 48
End: 2023-06-15

## 2023-06-15 NOTE — TELEPHONE ENCOUNTER
From: Forest Boas, MD  To: Danny Decker  Sent: 6/15/2023 2:46 PM CDT  Subject: Lab results. Your blood counts, kidney, and liver tests are unremarkable. Your inflammation markers are normal.    Take care!

## 2023-06-24 ENCOUNTER — PATIENT MESSAGE (OUTPATIENT)
Dept: RHEUMATOLOGY | Facility: CLINIC | Age: 48
End: 2023-06-24

## 2023-07-27 RX ORDER — GABAPENTIN 300 MG/1
300 CAPSULE ORAL NIGHTLY
COMMUNITY
End: 2023-08-26

## 2023-07-27 RX ORDER — SUMATRIPTAN 50 MG/1
50 TABLET, FILM COATED ORAL EVERY 2 HOUR PRN
Status: ON HOLD | COMMUNITY
End: 2023-08-24 | Stop reason: CLARIF

## 2023-07-27 RX ORDER — MULTIVIT-MIN/IRON/FOLIC ACID/K 18-600-40
1 CAPSULE ORAL DAILY
COMMUNITY

## 2023-08-08 ENCOUNTER — LAB ENCOUNTER (OUTPATIENT)
Dept: LAB | Age: 48
End: 2023-08-08
Attending: ORTHOPAEDIC SURGERY
Payer: MEDICARE

## 2023-08-08 DIAGNOSIS — Z01.818 PRE-OP TESTING: ICD-10-CM

## 2023-08-08 DIAGNOSIS — Z96.652 STATUS POST LEFT PARTIAL KNEE REPLACEMENT: ICD-10-CM

## 2023-08-08 PROCEDURE — 86901 BLOOD TYPING SEROLOGIC RH(D): CPT

## 2023-08-08 PROCEDURE — 36415 COLL VENOUS BLD VENIPUNCTURE: CPT

## 2023-08-08 PROCEDURE — 86850 RBC ANTIBODY SCREEN: CPT

## 2023-08-08 PROCEDURE — 86900 BLOOD TYPING SEROLOGIC ABO: CPT

## 2023-08-08 PROCEDURE — 87081 CULTURE SCREEN ONLY: CPT

## 2023-08-09 LAB
ANTIBODY SCREEN: NEGATIVE
RH BLOOD TYPE: POSITIVE

## 2023-08-24 ENCOUNTER — HOSPITAL ENCOUNTER (INPATIENT)
Facility: HOSPITAL | Age: 48
LOS: 2 days | Discharge: HOME HEALTH | End: 2023-08-26
Attending: ORTHOPAEDIC SURGERY | Admitting: ORTHOPAEDIC SURGERY
Payer: MEDICARE

## 2023-08-24 ENCOUNTER — ANESTHESIA (OUTPATIENT)
Dept: SURGERY | Facility: HOSPITAL | Age: 48
End: 2023-08-24
Payer: MEDICARE

## 2023-08-24 ENCOUNTER — ANESTHESIA EVENT (OUTPATIENT)
Dept: SURGERY | Facility: HOSPITAL | Age: 48
End: 2023-08-24
Payer: MEDICARE

## 2023-08-24 ENCOUNTER — APPOINTMENT (OUTPATIENT)
Dept: GENERAL RADIOLOGY | Facility: HOSPITAL | Age: 48
End: 2023-08-24
Attending: ORTHOPAEDIC SURGERY
Payer: MEDICARE

## 2023-08-24 DIAGNOSIS — Z01.818 PRE-OP TESTING: ICD-10-CM

## 2023-08-24 DIAGNOSIS — Z96.652 STATUS POST LEFT PARTIAL KNEE REPLACEMENT: Primary | ICD-10-CM

## 2023-08-24 LAB — B-HCG UR QL: NEGATIVE

## 2023-08-24 PROCEDURE — 3E0T3BZ INTRODUCTION OF ANESTHETIC AGENT INTO PERIPHERAL NERVES AND PLEXI, PERCUTANEOUS APPROACH: ICD-10-PCS | Performed by: ANESTHESIOLOGY

## 2023-08-24 PROCEDURE — 87070 CULTURE OTHR SPECIMN AEROBIC: CPT | Performed by: ORTHOPAEDIC SURGERY

## 2023-08-24 PROCEDURE — 87205 SMEAR GRAM STAIN: CPT | Performed by: ORTHOPAEDIC SURGERY

## 2023-08-24 PROCEDURE — 87075 CULTR BACTERIA EXCEPT BLOOD: CPT | Performed by: ORTHOPAEDIC SURGERY

## 2023-08-24 PROCEDURE — 97161 PT EVAL LOW COMPLEX 20 MIN: CPT

## 2023-08-24 PROCEDURE — 87176 TISSUE HOMOGENIZATION CULTR: CPT | Performed by: ORTHOPAEDIC SURGERY

## 2023-08-24 PROCEDURE — 97530 THERAPEUTIC ACTIVITIES: CPT

## 2023-08-24 PROCEDURE — 88311 DECALCIFY TISSUE: CPT | Performed by: ORTHOPAEDIC SURGERY

## 2023-08-24 PROCEDURE — 81025 URINE PREGNANCY TEST: CPT

## 2023-08-24 PROCEDURE — 88300 SURGICAL PATH GROSS: CPT | Performed by: ORTHOPAEDIC SURGERY

## 2023-08-24 PROCEDURE — 76942 ECHO GUIDE FOR BIOPSY: CPT | Performed by: ANESTHESIOLOGY

## 2023-08-24 PROCEDURE — 88331 PATH CONSLTJ SURG 1 BLK 1SPC: CPT | Performed by: ORTHOPAEDIC SURGERY

## 2023-08-24 PROCEDURE — 87077 CULTURE AEROBIC IDENTIFY: CPT | Performed by: ORTHOPAEDIC SURGERY

## 2023-08-24 PROCEDURE — 0SPD0NZ REMOVAL OF PATELLOFEMORAL SYNTHETIC SUBSTITUTE FROM LEFT KNEE JOINT, OPEN APPROACH: ICD-10-PCS | Performed by: ORTHOPAEDIC SURGERY

## 2023-08-24 PROCEDURE — 88305 TISSUE EXAM BY PATHOLOGIST: CPT | Performed by: ORTHOPAEDIC SURGERY

## 2023-08-24 PROCEDURE — 73560 X-RAY EXAM OF KNEE 1 OR 2: CPT | Performed by: ORTHOPAEDIC SURGERY

## 2023-08-24 PROCEDURE — 0SRD0J9 REPLACEMENT OF LEFT KNEE JOINT WITH SYNTHETIC SUBSTITUTE, CEMENTED, OPEN APPROACH: ICD-10-PCS | Performed by: ORTHOPAEDIC SURGERY

## 2023-08-24 RX ORDER — ESCITALOPRAM OXALATE 20 MG/1
20 TABLET ORAL DAILY
Status: DISCONTINUED | OUTPATIENT
Start: 2023-08-24 | End: 2023-08-26

## 2023-08-24 RX ORDER — HYDROMORPHONE HYDROCHLORIDE 1 MG/ML
0.4 INJECTION, SOLUTION INTRAMUSCULAR; INTRAVENOUS; SUBCUTANEOUS EVERY 2 HOUR PRN
Status: DISCONTINUED | OUTPATIENT
Start: 2023-08-24 | End: 2023-08-26

## 2023-08-24 RX ORDER — DEXAMETHASONE SODIUM PHOSPHATE 10 MG/ML
8 INJECTION, SOLUTION INTRAMUSCULAR; INTRAVENOUS ONCE
Status: COMPLETED | OUTPATIENT
Start: 2023-08-25 | End: 2023-08-25

## 2023-08-24 RX ORDER — BUPROPION HYDROCHLORIDE 300 MG/1
300 TABLET ORAL DAILY
COMMUNITY
Start: 2023-05-11

## 2023-08-24 RX ORDER — ACETAMINOPHEN 500 MG
1000 TABLET ORAL ONCE AS NEEDED
Status: DISCONTINUED | OUTPATIENT
Start: 2023-08-24 | End: 2023-08-24 | Stop reason: HOSPADM

## 2023-08-24 RX ORDER — TRANEXAMIC ACID 10 MG/ML
1000 INJECTION, SOLUTION INTRAVENOUS ONCE
Status: COMPLETED | OUTPATIENT
Start: 2023-08-24 | End: 2023-08-24

## 2023-08-24 RX ORDER — DIPHENHYDRAMINE HYDROCHLORIDE 50 MG/ML
25 INJECTION INTRAMUSCULAR; INTRAVENOUS ONCE AS NEEDED
Status: ACTIVE | OUTPATIENT
Start: 2023-08-24 | End: 2023-08-24

## 2023-08-24 RX ORDER — HYDROCODONE BITARTRATE AND ACETAMINOPHEN 5; 325 MG/1; MG/1
2 TABLET ORAL ONCE AS NEEDED
Status: DISCONTINUED | OUTPATIENT
Start: 2023-08-24 | End: 2023-08-24 | Stop reason: HOSPADM

## 2023-08-24 RX ORDER — DOCUSATE SODIUM 100 MG/1
100 CAPSULE, LIQUID FILLED ORAL 2 TIMES DAILY
Status: DISCONTINUED | OUTPATIENT
Start: 2023-08-24 | End: 2023-08-26

## 2023-08-24 RX ORDER — HYDROMORPHONE HYDROCHLORIDE 1 MG/ML
0.8 INJECTION, SOLUTION INTRAMUSCULAR; INTRAVENOUS; SUBCUTANEOUS EVERY 2 HOUR PRN
Status: DISCONTINUED | OUTPATIENT
Start: 2023-08-24 | End: 2023-08-26

## 2023-08-24 RX ORDER — RIZATRIPTAN BENZOATE 5 MG/1
5 TABLET ORAL SEE ADMIN INSTRUCTIONS
COMMUNITY

## 2023-08-24 RX ORDER — LEFLUNOMIDE 20 MG/1
20 TABLET ORAL DAILY
Status: DISCONTINUED | OUTPATIENT
Start: 2023-08-24 | End: 2023-08-26

## 2023-08-24 RX ORDER — FAMOTIDINE 20 MG/1
20 TABLET, FILM COATED ORAL 2 TIMES DAILY
Status: DISCONTINUED | OUTPATIENT
Start: 2023-08-24 | End: 2023-08-26

## 2023-08-24 RX ORDER — SODIUM CHLORIDE, SODIUM LACTATE, POTASSIUM CHLORIDE, CALCIUM CHLORIDE 600; 310; 30; 20 MG/100ML; MG/100ML; MG/100ML; MG/100ML
INJECTION, SOLUTION INTRAVENOUS CONTINUOUS
Status: DISCONTINUED | OUTPATIENT
Start: 2023-08-24 | End: 2023-08-26

## 2023-08-24 RX ORDER — HYDROMORPHONE HYDROCHLORIDE 1 MG/ML
0.6 INJECTION, SOLUTION INTRAMUSCULAR; INTRAVENOUS; SUBCUTANEOUS EVERY 5 MIN PRN
Status: DISCONTINUED | OUTPATIENT
Start: 2023-08-24 | End: 2023-08-24 | Stop reason: HOSPADM

## 2023-08-24 RX ORDER — SODIUM CHLORIDE, SODIUM LACTATE, POTASSIUM CHLORIDE, CALCIUM CHLORIDE 600; 310; 30; 20 MG/100ML; MG/100ML; MG/100ML; MG/100ML
INJECTION, SOLUTION INTRAVENOUS CONTINUOUS
Status: DISCONTINUED | OUTPATIENT
Start: 2023-08-24 | End: 2023-08-24 | Stop reason: HOSPADM

## 2023-08-24 RX ORDER — ACETAMINOPHEN 500 MG
1000 TABLET ORAL ONCE
Status: DISCONTINUED | OUTPATIENT
Start: 2023-08-24 | End: 2023-08-24 | Stop reason: HOSPADM

## 2023-08-24 RX ORDER — PROCHLORPERAZINE EDISYLATE 5 MG/ML
5 INJECTION INTRAMUSCULAR; INTRAVENOUS EVERY 8 HOURS PRN
Status: DISCONTINUED | OUTPATIENT
Start: 2023-08-24 | End: 2023-08-26

## 2023-08-24 RX ORDER — CEFAZOLIN SODIUM/WATER 2 G/20 ML
2 SYRINGE (ML) INTRAVENOUS ONCE
Status: COMPLETED | OUTPATIENT
Start: 2023-08-24 | End: 2023-08-24

## 2023-08-24 RX ORDER — MELATONIN
325
Status: DISCONTINUED | OUTPATIENT
Start: 2023-08-24 | End: 2023-08-26

## 2023-08-24 RX ORDER — OXYCODONE HYDROCHLORIDE 5 MG/1
5 TABLET ORAL EVERY 4 HOURS PRN
Status: DISCONTINUED | OUTPATIENT
Start: 2023-08-24 | End: 2023-08-26

## 2023-08-24 RX ORDER — BISACODYL 10 MG
10 SUPPOSITORY, RECTAL RECTAL
Status: DISCONTINUED | OUTPATIENT
Start: 2023-08-24 | End: 2023-08-26

## 2023-08-24 RX ORDER — POLYETHYLENE GLYCOL 3350 17 G/17G
17 POWDER, FOR SOLUTION ORAL DAILY PRN
Status: DISCONTINUED | OUTPATIENT
Start: 2023-08-24 | End: 2023-08-26

## 2023-08-24 RX ORDER — ONDANSETRON 2 MG/ML
4 INJECTION INTRAMUSCULAR; INTRAVENOUS EVERY 6 HOURS PRN
Status: DISCONTINUED | OUTPATIENT
Start: 2023-08-24 | End: 2023-08-24 | Stop reason: HOSPADM

## 2023-08-24 RX ORDER — PREGABALIN 75 MG/1
75 CAPSULE ORAL DAILY
Qty: 30 CAPSULE | Refills: 0 | COMMUNITY
Start: 2023-08-16 | End: 2023-09-15

## 2023-08-24 RX ORDER — MIDAZOLAM HYDROCHLORIDE 1 MG/ML
INJECTION INTRAMUSCULAR; INTRAVENOUS AS NEEDED
Status: DISCONTINUED | OUTPATIENT
Start: 2023-08-24 | End: 2023-08-24 | Stop reason: SURG

## 2023-08-24 RX ORDER — OXYCODONE HYDROCHLORIDE 10 MG/1
10 TABLET ORAL EVERY 4 HOURS PRN
Status: DISCONTINUED | OUTPATIENT
Start: 2023-08-24 | End: 2023-08-26

## 2023-08-24 RX ORDER — CEPHALEXIN 500 MG/1
500 CAPSULE ORAL 2 TIMES DAILY
Qty: 20 CAPSULE | Refills: 0 | COMMUNITY
Start: 2023-08-20 | End: 2023-08-30

## 2023-08-24 RX ORDER — HYDROMORPHONE HYDROCHLORIDE 1 MG/ML
0.4 INJECTION, SOLUTION INTRAMUSCULAR; INTRAVENOUS; SUBCUTANEOUS EVERY 5 MIN PRN
Status: DISCONTINUED | OUTPATIENT
Start: 2023-08-24 | End: 2023-08-24 | Stop reason: HOSPADM

## 2023-08-24 RX ORDER — LIDOCAINE HYDROCHLORIDE 10 MG/ML
INJECTION, SOLUTION EPIDURAL; INFILTRATION; INTRACAUDAL; PERINEURAL AS NEEDED
Status: DISCONTINUED | OUTPATIENT
Start: 2023-08-24 | End: 2023-08-24 | Stop reason: SURG

## 2023-08-24 RX ORDER — ASPIRIN 81 MG/1
81 TABLET ORAL 2 TIMES DAILY
Qty: 60 TABLET | Refills: 1 | COMMUNITY
Start: 2023-08-16 | End: 2023-09-27

## 2023-08-24 RX ORDER — HYDROMORPHONE HYDROCHLORIDE 1 MG/ML
0.2 INJECTION, SOLUTION INTRAMUSCULAR; INTRAVENOUS; SUBCUTANEOUS EVERY 5 MIN PRN
Status: DISCONTINUED | OUTPATIENT
Start: 2023-08-24 | End: 2023-08-24 | Stop reason: HOSPADM

## 2023-08-24 RX ORDER — KETOROLAC TROMETHAMINE 15 MG/ML
30 INJECTION, SOLUTION INTRAMUSCULAR; INTRAVENOUS EVERY 6 HOURS
Status: COMPLETED | OUTPATIENT
Start: 2023-08-24 | End: 2023-08-25

## 2023-08-24 RX ORDER — ASPIRIN 81 MG/1
81 TABLET ORAL 2 TIMES DAILY
Status: DISCONTINUED | OUTPATIENT
Start: 2023-08-24 | End: 2023-08-26

## 2023-08-24 RX ORDER — DEXAMETHASONE SODIUM PHOSPHATE 10 MG/ML
INJECTION, SOLUTION INTRAMUSCULAR; INTRAVENOUS AS NEEDED
Status: DISCONTINUED | OUTPATIENT
Start: 2023-08-24 | End: 2023-08-24 | Stop reason: SURG

## 2023-08-24 RX ORDER — ACETAMINOPHEN 325 MG/1
650 TABLET ORAL ONCE
Status: COMPLETED | OUTPATIENT
Start: 2023-08-24 | End: 2023-08-24

## 2023-08-24 RX ORDER — ENEMA 19; 7 G/133ML; G/133ML
1 ENEMA RECTAL ONCE AS NEEDED
Status: DISCONTINUED | OUTPATIENT
Start: 2023-08-24 | End: 2023-08-26

## 2023-08-24 RX ORDER — ONDANSETRON 2 MG/ML
4 INJECTION INTRAMUSCULAR; INTRAVENOUS EVERY 6 HOURS PRN
Status: DISCONTINUED | OUTPATIENT
Start: 2023-08-24 | End: 2023-08-26

## 2023-08-24 RX ORDER — DIPHENHYDRAMINE HYDROCHLORIDE 50 MG/ML
12.5 INJECTION INTRAMUSCULAR; INTRAVENOUS EVERY 4 HOURS PRN
Status: DISCONTINUED | OUTPATIENT
Start: 2023-08-24 | End: 2023-08-26

## 2023-08-24 RX ORDER — SENNOSIDES 8.6 MG
17.2 TABLET ORAL NIGHTLY
Status: DISCONTINUED | OUTPATIENT
Start: 2023-08-24 | End: 2023-08-26

## 2023-08-24 RX ORDER — CELECOXIB 200 MG/1
200 CAPSULE ORAL DAILY
Qty: 30 CAPSULE | Refills: 0 | COMMUNITY
Start: 2023-08-16 | End: 2023-09-15

## 2023-08-24 RX ORDER — GABAPENTIN 300 MG/1
300 CAPSULE ORAL NIGHTLY
Status: DISCONTINUED | OUTPATIENT
Start: 2023-08-24 | End: 2023-08-26

## 2023-08-24 RX ORDER — SCOLOPAMINE TRANSDERMAL SYSTEM 1 MG/1
1 PATCH, EXTENDED RELEASE TRANSDERMAL ONCE
Status: DISCONTINUED | OUTPATIENT
Start: 2023-08-24 | End: 2023-08-24 | Stop reason: HOSPADM

## 2023-08-24 RX ORDER — AMOXICILLIN 250 MG
1 CAPSULE ORAL DAILY
COMMUNITY
Start: 2023-08-16

## 2023-08-24 RX ORDER — ACETAMINOPHEN 325 MG/1
650 TABLET ORAL 4 TIMES DAILY
Status: DISCONTINUED | OUTPATIENT
Start: 2023-08-24 | End: 2023-08-26

## 2023-08-24 RX ORDER — MIDAZOLAM HYDROCHLORIDE 1 MG/ML
1 INJECTION INTRAMUSCULAR; INTRAVENOUS EVERY 5 MIN PRN
Status: DISCONTINUED | OUTPATIENT
Start: 2023-08-24 | End: 2023-08-24 | Stop reason: HOSPADM

## 2023-08-24 RX ORDER — BUPROPION HYDROCHLORIDE 300 MG/1
300 TABLET ORAL DAILY
Status: DISCONTINUED | OUTPATIENT
Start: 2023-08-24 | End: 2023-08-26

## 2023-08-24 RX ORDER — BUPRENORPHINE HYDROCHLORIDE 0.32 MG/ML
INJECTION INTRAMUSCULAR; INTRAVENOUS AS NEEDED
Status: DISCONTINUED | OUTPATIENT
Start: 2023-08-24 | End: 2023-08-24 | Stop reason: SURG

## 2023-08-24 RX ORDER — DIPHENHYDRAMINE HCL 25 MG
25 CAPSULE ORAL EVERY 4 HOURS PRN
Status: DISCONTINUED | OUTPATIENT
Start: 2023-08-24 | End: 2023-08-26

## 2023-08-24 RX ORDER — TRAMADOL HYDROCHLORIDE 50 MG/1
1 TABLET ORAL
COMMUNITY
Start: 2023-08-16

## 2023-08-24 RX ORDER — TRAMADOL HYDROCHLORIDE 50 MG/1
50 TABLET ORAL EVERY 6 HOURS SCHEDULED
Status: DISCONTINUED | OUTPATIENT
Start: 2023-08-24 | End: 2023-08-26

## 2023-08-24 RX ORDER — OXYCODONE HYDROCHLORIDE 5 MG/1
1 TABLET ORAL EVERY 4 HOURS PRN
COMMUNITY
Start: 2023-08-16

## 2023-08-24 RX ORDER — MEPERIDINE HYDROCHLORIDE 25 MG/ML
25 INJECTION INTRAMUSCULAR; INTRAVENOUS; SUBCUTANEOUS
Status: DISCONTINUED | OUTPATIENT
Start: 2023-08-24 | End: 2023-08-24 | Stop reason: HOSPADM

## 2023-08-24 RX ORDER — CEFAZOLIN SODIUM/WATER 2 G/20 ML
2 SYRINGE (ML) INTRAVENOUS EVERY 8 HOURS
Status: COMPLETED | OUTPATIENT
Start: 2023-08-24 | End: 2023-08-24

## 2023-08-24 RX ORDER — ACETAMINOPHEN 325 MG/1
TABLET ORAL
Status: COMPLETED
Start: 2023-08-24 | End: 2023-08-24

## 2023-08-24 RX ORDER — FAMOTIDINE 10 MG/ML
20 INJECTION, SOLUTION INTRAVENOUS 2 TIMES DAILY
Status: DISCONTINUED | OUTPATIENT
Start: 2023-08-24 | End: 2023-08-26

## 2023-08-24 RX ORDER — HYDROCODONE BITARTRATE AND ACETAMINOPHEN 5; 325 MG/1; MG/1
1 TABLET ORAL ONCE AS NEEDED
Status: DISCONTINUED | OUTPATIENT
Start: 2023-08-24 | End: 2023-08-24 | Stop reason: HOSPADM

## 2023-08-24 RX ORDER — TIZANIDINE 2 MG/1
2 TABLET ORAL EVERY 6 HOURS PRN
Status: DISCONTINUED | OUTPATIENT
Start: 2023-08-24 | End: 2023-08-26

## 2023-08-24 RX ORDER — NALOXONE HYDROCHLORIDE 0.4 MG/ML
0.08 INJECTION, SOLUTION INTRAMUSCULAR; INTRAVENOUS; SUBCUTANEOUS AS NEEDED
Status: DISCONTINUED | OUTPATIENT
Start: 2023-08-24 | End: 2023-08-24 | Stop reason: HOSPADM

## 2023-08-24 RX ADMIN — LIDOCAINE HYDROCHLORIDE 10 MG: 10 INJECTION, SOLUTION EPIDURAL; INFILTRATION; INTRACAUDAL; PERINEURAL at 07:15:00

## 2023-08-24 RX ADMIN — CEFAZOLIN SODIUM/WATER 2 G: 2 G/20 ML SYRINGE (ML) INTRAVENOUS at 07:09:00

## 2023-08-24 RX ADMIN — TRANEXAMIC ACID 1000 MG: 10 INJECTION, SOLUTION INTRAVENOUS at 07:20:00

## 2023-08-24 RX ADMIN — BUPRENORPHINE HYDROCHLORIDE 150 MCG: 0.32 INJECTION INTRAMUSCULAR; INTRAVENOUS at 07:19:00

## 2023-08-24 RX ADMIN — MIDAZOLAM HYDROCHLORIDE 2 MG: 1 INJECTION INTRAMUSCULAR; INTRAVENOUS at 07:08:00

## 2023-08-24 RX ADMIN — DEXAMETHASONE SODIUM PHOSPHATE 2 MG: 10 INJECTION, SOLUTION INTRAMUSCULAR; INTRAVENOUS at 07:19:00

## 2023-08-24 NOTE — PROGRESS NOTES
NURSING ADMISSION NOTE      Patient admitted via Cart/bed from PACU post left total knee replacement by Dr. Casye Rascon. Oriented to room. Received awake, alert and oriented x 4, family at the bedside. Safety precautions initiated. Post surgical order discussed with patient. Bed in low position. Assisted to use bedside commode with x 2 assistance. Call light in reach. Reinforce instructions to use call light for assistance. [facility]. Dr. Susan Cedeño text paged. Anticipate PT to see patient this afternoon.

## 2023-08-24 NOTE — ANESTHESIA PROCEDURE NOTES
Regional Block    Date/Time: 8/24/2023 7:11 AM    Performed by: Jim Luke MD  Authorized by: Jim Luke MD      General Information and Staff    Start Time:  8/24/2023 7:11 AM  End Time:  8/24/2023 7:14 AM  Anesthesiologist:  Bennett Metzger MD  Performed by: Anesthesiologist  Patient Location:  OR    Block Placement: Post Induction  Site Identification: real time ultrasound guided and image stored and retrievable    Block site/laterality marked before start: site marked  Reason for Block: at surgeon's request and post-op pain management    Preanesthetic Checklist: 2 patient identifers, IV checked, site marked, risks and benefits discussed, monitors and equipment checked, pre-op evaluation, timeout performed, anesthesia consent, sterile technique used, no prohibitive neurological deficits and no local skin infection at insertion site      Procedure Details    Patient Position:   Prep: ChloraPrep    Monitoring:  Cardiac monitor, continuous pulse ox and blood pressure cuff  Block Type: Adductor canal  Laterality:  Left  Injection Technique:  Single-shot    Needle    Needle Type:  Short-bevel and echogenic  Needle Gauge:  20 G  Needle Length:  110 mm  Needle Localization:  Ultrasound guidance  Reason for Ultrasound Use: appropriate spread of the medication was noted in real time and no ultrasound evidence of intravascular and/or intraneural injection            Assessment    Injection Assessment:  Good spread noted, negative resistance, negative aspiration for heme, incremental injection and low pressure  Paresthesia Pain:  Immediately resolved  Heart Rate Change: No    - Patient tolerated block procedure well without evidence of immediate block related complications.      Medications  8/24/2023 7:11 AM      Additional Comments    Medication:  Bupivacaine 0.375% 20mL with PF dexamethasone 2mg and buprenorphine 150mcg

## 2023-08-24 NOTE — HOME CARE LIAISON
Pt was set up preoperatively with [facility]. Will continue to follow. Met with patient at the bedside and remains agreeable for home health care. Address and pass code review.  Will remain available for all home health questions if needed

## 2023-08-24 NOTE — ANESTHESIA PROCEDURE NOTES
Spinal Block    Date/Time: 8/24/2023 7:11 AM    Performed by: Severo Luke MD  Authorized by: Severo Luke MD      General Information and Staff    Start Time:  8/24/2023 7:11 AM  End Time:  8/24/2023 7:14 AM  Anesthesiologist:  Eugene Jarvis MD  Performed by:   Anesthesiologist  Site identification: surface landmarks    Preanesthetic Checklist: patient identified, IV checked, risks and benefits discussed, monitors and equipment checked, pre-op evaluation, timeout performed, anesthesia consent and sterile technique used      Procedure Details    Patient Position:  Sitting  Prep: ChloraPrep    Monitoring:  Cardiac monitor, heart rate and continuous pulse ox  Approach:  Midline  Location:  L3-4  Injection Technique:  Single-shot    Needle    Needle Type:  Sprotte  Needle Gauge:  24 G  Needle Length:  3.5 in    Assessment    Sensory Level:   Events: clear CSF, CSF aspirated, well tolerated and blood negative      Additional Comments

## 2023-08-24 NOTE — INTERVAL H&P NOTE
Pre-op Diagnosis: S/P LEFT PARTIAL KNEE REPLACEMENT    The above referenced H&P was reviewed by Francia Aranda MD on 8/24/2023, the patient was examined and no significant changes have occurred in the patient's condition since the H&P was performed. I discussed with the patient and/or legal representative the potential benefits, risks and side effects of this procedure; the likelihood of the patient achieving goals; and potential problems that might occur during recuperation. I discussed reasonable alternatives to the procedure, including risks, benefits and side effects related to the alternatives and risks related to not receiving this procedure. We will proceed with procedure as planned.

## 2023-08-24 NOTE — OPERATIVE REPORT
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 Angela Oscar M.D. Assistant(s):  1. MICHAELA Edwards  2. Dakotah Agrawal - surgical assist   Anesthesia: Epidural/Femoral Nerve Block  Estimated blood loss: 150cc milliliters   Specimens: 3 tissue cultures knee fluid, tibia, femur. One frozen section sent intraoperatively which was negative for acute infection  Complications: None apparent     INDICATIONS: Gracia Teixeira is a pleasant  who presented to my office with a long history of knee pain following a primary unicompartmental patellofemoral joint arthroplasty. They initially did very well but has had increasing pain over the past year and a half. Their pain was mostly with starting to walk and upon trying to rise from a chair. The pain had become debilitating for them and they failed conservative therapy. We discussed surgical treatment options including revision total knee arthroplasty. He had a negative infectious work-up including an aspirate and a xrays demonstrated suspected loosening of component and progressive arthritis in the additional compartments. Prior to surgery we discussed the risks, benefits, alternatives to surgery, and surgical convalescence. Surgical consent was obtained both in the office, as well as the day of surgery.  Risks of the procedure include, but are not limited to, infection, DVT, PE, damage to nerves or blood vessels at the time of surgery, bleeding and blood loss, stiffness/arthrofibrosis, and risk of loosening or failure of the components requiring revision surgery. We also discussed the possibility of an occult infection and that multiple specimens would be taken in the operating room. Should these demonstrate anything concerning then he may require prolonged antibiotics and even an eventual two-staged procedure. The patient expressed understanding and wished to proceed with the surgery. An informed consent form was signed and placed on the chart prior to coming to the Operating Room. PROCEDURE: The patient was brought to the operating room after lower extremity nerve blocks had been performed. Once obtaining satisfactory anesthesia was placed in the supine position. The knee was prepped and draped in the usual sterile fashion for a total knee replacement. Pre-operative antibiotics were given following SCIP guidelines. The leg was exsanguinated with an Esmarch bandage and the pneumatic tourniquet inflated to 250 mm mercury. The previous longitudinal incision was opened over the anterior aspect of the knee exposing the extensor mechanism. An arthrotomy was performed and the patella displaced laterally. The gross pathologic findings revealed moderate synovitis and some osteolysis especially beneath the femoral component. Subperiosteal dissection was continued around the proximal medial tibia. The necessary soft tissue release was performed to correct the fixed angular deformity. Care was taken to protect and preserve the medial collateral ligament. A culture was taken of the synovium and a frozen section was sent as well that was negative for acute infection. A saw and osteotomes was then inserted between the femoral component and the cement interface. The component was removed with minimal bone loss. The cement was then removed from the femur and a tissue culture was sent from the femoral surface. The soft tissue membrane was then removed and all cement and debris was removed from the femur. All areas of inflamed synovium were removed.  A culture was then sent from the tibial surface. Extensive scar tissue was removed from both the medial and lateral gutters, as well as the anterior interval.   Following this, the drill was used to open the femoral canal. After over drilling the canal, the intramedullary femoral guide was seated and the distal femur was resected at the stated valgus angle. The extramedullary tibial cutting guide was then placed and the proximal tibia was resected at the appropriate level perpendicular to the long axis of the tibia. The epicondylar axis and AP axis were determined. The femoral sizing guide was set at the appropriate degree of external rotation. The femur was then sized and the appropriate component selected. The anterior and posterior condyles were then resected with the appropriately sized guide and oscillating saw. With the knee at 90 degrees of flexion, laminar spacers were placed between the femur and tibia. The anterior cruciate ligament was excised. The posterior cruciate ligament was left intact. A medial and lateral meniscectomy were performed. A mixture of 30cc of 0.5% ropivicaine, 10mg Morphine, 30mg toradol, 0.5mg of epinephrine, clonidine 1mL were injected into the knee joint capsule posteriorly while the lamina spreaders were in position and at the capsulotomy incision sites prior to closure for local anesthesia. The flexion and extension gaps were checked with the appropriate sized spacer and noted to be well balanced. At this time, the tibial cut was checked with the spacer block and the alignment angel. The distal end of the femur was sculptured with the notch and chamfer cutting guide using an oscillating saw. The tibial fixation hole was created with the tibial template and broach. The synovium was excised from around the patella, after which the patella thickness was measured with calipers. The patella was then prepared with the patellar reaming system.   The appropriate sized patellar template was seated and the three fixation holes were created with a drill. A bone plug was then shaped and place in the opening created for the intramedullary femoral guide. The trial components were then placed and the knee was found to have proper alignment and was stable through a full range of flexion and extension. The trial components were removed and the bony surfaces were cleaned with pulsatile lavage and an antibiotic solution. The bone was then dried and the permanent components were cemented in a sequential fashion. The tibial component was cemented first. Set in the proper position and rotation. The tibial component was impacted into place, it was fully seated and excess cemented was removed. The femoral component was then cemented in place followed by the patella. Excess cement was removed. Once the cement was hardened the tibial articular surface was implanted. The knee was then reduced and found to have good flexion, full extension with good stability and proper alignment. The patella tracked central.    A dilute (0.35%) betadine lavage was placed in the arthrotomy site to soak the components for 3 minutes prior to wound closure. The solution was made by adding 17.5 ml of 10% povidone-iodine in 500 cc of normal saline, resulting in a 0.35% dilution. This solution was then removed from the knee and the knee was copiously irrigated. The tourniquet was then released and hemostasis was obtained with electrocautery. The knee irrigated with pulsatile lavage. The arthrotomy was closed with # 0 PDS interrupted sutures and running barbed sutures. The subcutaneous tissue was closed in layers with # 0 and # 3-0 Vicryl interrupted sutures. The skin was closed with nylons. A sterile compressive dressing was applied and the drain activated with suction. The patient tolerated the procedure well, without complication, and was transferred to the recovery room in a stable condition.  The sponge and instrument count was correct. A surgical first assistant was required and necessary for the completion of this case to aid in manipulation and positioning of the extremity while the surgeon operated. Their assistance was vital to the safety and success of the procedure. Deep venous thrombosis prophylaxis will consist of aspirin BID for 6 weeks  She will give 10 days of prophylactic antibiotics given high risk.      Electronically Signed By: Megan Veras MD, 8/24/2023, 9:20 AM

## 2023-08-24 NOTE — PHYSICAL THERAPY NOTE
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 ASSESSMENT   In this PT evaluation, the patient presents with the following impairments decreased strength, functional mobility, trunk control, endurance, balance, increased pain. These impairments and comorbidities manifest themselves as functional limitations in independent bed mobility, transfers, and gait, stairs. The patient is below baseline and would benefit from skilled inpatient PT to address the above deficits to assist patient in returning to prior to level of function. Functional outcome measures completed include AMPAC. The AM-PAC '6-Clicks' Inpatient Basic Mobility Short Form was completed and this patient is demonstrating a Approx Degree of Impairment: 50.57%  degree of impairment in mobility. Research supports that patients with this level of impairment may benefit from [address] . DISCHARGE RECOMMENDATIONS  PT Discharge Recommendations: Home with home health PT    PLAN  PT Treatment Plan: Bed mobility; Body mechanics; Endurance; Energy conservation;Patient education; Family education;Gait training;Balance training;Transfer training;Stair training;Stoop training;Strengthening  Rehab Potential : Good  Frequency (Obs): Daily  Number of Visits to Meet Established Goals: 2      CURRENT GOALS    Goal #1 Patient is able to demonstrate supine - sit EOB @ level: modified independent  MET   Goal #2 Patient is able to demonstrate transfers EOB to/from Chair/Wheelchair at assistance level: supervision     Goal #3 Patient is able to ambulate 150 feet with assist device: walker - rolling at assistance level: supervision     Goal #4 Pt is able to ascend and descend 4 stairs with rail and supervision   Goal #5 Pt is able to ascend and descend stoop step with RW   Goal #6    Goal Comments: Goals established on 8/24/2023    HOME SITUATION  [place] of [place] [facility] [place] [address]: One level  Stairs to Enter : 7 (14 to laundry)  Railing: Yes  Stairs to Bedroom: 0       Lives With: Alone  Drives: No  Patient Owned Equipment: Rolling walker;Rollator       Prior Level of [place]: Pt reports that she uses RW for long distances. Also has a rollator. Normally takes public transportation or taxi. Indep with ADLs but has assist equipment (reacher, ?sock aid), does light cooking and cleaning. Also has transfer bench and commode over the toilet. Uncle is going to assist during recovery as well as [employer] friends.      SUBJECTIVE  \"So we won't be doing stairs today it looks like\"  Pt showing therapist photos of apartment set up - fridge, doors, stairs, stoop, walkway       OBJECTIVE  Precautions: Bed/chair alarm  Fall Risk: High fall risk    WEIGHT BEARING RESTRICTION  Weight Bearing Restriction: L lower extremity           L Lower Extremity: Weight Bearing as Tolerated    PAIN ASSESSMENT  Rating: Unable to rate  Location: left knee  Management Techniques: Repositioning    COGNITION  Awareness of Errors:  decreased awareness of errors     RANGE OF MOTION AND STRENGTH ASSESSMENT    Lower extremity ROM is within functional limits except for the following:  L knee s/p TKA    Lower extremity strength is within functional limits except for the following:   L knee s/p TKA      BALANCE  Static Sitting: Fair +  Dynamic Sitting: Fair  Static Standing: Poor +  Dynamic Standing: Poor +    ADDITIONAL TESTS                                    ACTIVITY TOLERANCE                         O2 WALK  Oxygen Therapy  SPO2% on Room Air at Rest: 80    NEUROLOGICAL FINDINGS                        AM-PAC '6-Clicks' INPATIENT SHORT FORM - BASIC MOBILITY  How much difficulty does the patient currently have. .. Patient Difficulty: Turning over in bed (including adjusting bedclothes, sheets and blankets)?: None   Patient Difficulty: Sitting down on and standing up from a chair with arms (e.g., wheelchair, bedside commode, etc.): A Little   Patient Difficulty: Moving from lying on back to sitting on the side of the bed?: None   How much help from another person does the patient currently need. .. Help from Another: Moving to and from a bed to a chair (including a wheelchair)?: A Little   Help from Another: Need to walk in hospital room?: A Lot   Help from Another: Climbing 3-5 steps with a railing?: Total       AM-PAC Score:  Raw Score: 17   Approx Degree of Impairment: 50.57%   Standardized Score (AM-PAC Scale): 42.13   CMS Modifier (G-Code): CK    FUNCTIONAL ABILITY STATUS  Gait Assessment   Functional Mobility/Gait Assessment  Gait Assistance: Not tested    Skilled Therapy Provided     Gait Training:   Pt cued on upright standing posture to improve alignment with UEs  Pt cued on proper UE placement on RW handles  Cued on shifting laterally to promote WB through RLE - pt buckled, deferred further activity     Therapeutic Activity:   Pt cued on placement of UE and LEs for optimal force generation and safe STS transfer. Pt cued on usage of arm rests for controlled descent into sitting  Cued on pivoting to her strong RIGHT side, pt able to slide foot and take small hop steps        Bed Mobility:  Rolling: NT  Supine to sit: mod I    Sit to supine: mod I      Transfer Mobility:  Sit to stand: CGA   Stand to sit: CGA  Pivot transfer: CGA    Therapist's Comments: RN cleared for session. Pt agreeable for therapy, received supine. Instructed to call for nursing staff for any needs and OOB mobility.        Exercise/Education Provided:  Bed mobility  Body mechanics  Energy conservation  Functional activity tolerated  Gait training  Posture  Strengthening  Transfer training    Patient End of Session: Up in chair;Needs met;Call light within reach;RN aware of session/findings; All patient questions and concerns addressed; Alarm set; Discussed recommendations with /;SCDs in place; Ice applied      Patient Evaluation Complexity Level:  History Moderate - 1 or 2 personal factors and/or co-morbidities   Examination of body systems Low - addressing 1-2 elements   Clinical Presentation Low - Stable   Clinical Decision Making Low - Stable       PT Session Time: 20 minutes  Gait Trainin minutes  Therapeutic Activity: 10 minutes

## 2023-08-24 NOTE — CM/SW NOTE
08/24/23 1600   CM/SW Referral Data   Referral Source Social Work (self-referral)   Reason for Referral Discharge planning   Informant EMR;Clinical Staff Member   Discharge Needs   Anticipated D/C needs Home health care       Patient is a 52 y/o woman admitted s/p conversion from partial to total knee arthroplasty. Pt with pre-operative plan for Residential at [place]. PT recommending [facility] services at discharge. Sherine at [facility] [place] confirmed pt accepted for [facility] services at OR. No other OR needs/concerns identified at this time. / to remain available for support and/or discharge planning. HOME SITUATION per PT eval:   Type of Home: Apartment   Home Layout: One level  Stairs to Enter : 7 (14 to laundry)  Railing: Yes  Stairs to Bedroom: 0     Lives With: Alone  Drives: No  Patient Owned Equipment: Rolling walker;Rollator     Prior Level of [place]: Pt reports that she uses RW for long distances. Also has a rollator. Normally takes public transportation or taxi. Indep with ADLs but has assist equipment (reacher, ?sock aid), does light cooking and cleaning. Also has transfer bench and commode over the toilet. Uncle is going to assist during recovery as well as [employer] friends.         Ying Keenan, [facility]  Discharge Planner  [phone]

## 2023-08-25 PROCEDURE — 97116 GAIT TRAINING THERAPY: CPT

## 2023-08-25 PROCEDURE — 97110 THERAPEUTIC EXERCISES: CPT

## 2023-08-25 PROCEDURE — 97530 THERAPEUTIC ACTIVITIES: CPT

## 2023-08-25 PROCEDURE — 97535 SELF CARE MNGMENT TRAINING: CPT

## 2023-08-25 PROCEDURE — 97165 OT EVAL LOW COMPLEX 30 MIN: CPT

## 2023-08-25 RX ORDER — PSEUDOEPHEDRINE HCL 30 MG
100 TABLET ORAL 2 TIMES DAILY PRN
Qty: 20 CAPSULE | Refills: 0 | Status: SHIPPED | OUTPATIENT
Start: 2023-08-25

## 2023-08-25 NOTE — PLAN OF CARE
Pt A&O. On room air. Encouraged use of incentive spirometer and ankle pump exercises every hour while awake. Scds to BLE. Spanda- to LLE. Pt has chronic lymphedema to BLE. Tolerating diet with good appetite. Last BM 8/23. Miralax given this AM. Voiding w/o difficulty. Pain managed with oral medications. Participating in therapy as ordered. Up with standby assist using walker and gait belt. Left knee aquacel silver drsg C/D/I. Gel ice packs on for pain/swelling. Pt lives home alone, but has her uncle and other [employer] friends that will be assisting her at home. Pt plans to be dc'd home with [facility] tomorrow after one more session of therapy.

## 2023-08-25 NOTE — PLAN OF CARE
Pt AOx4. VSS on RA. SCD's on. +2 edema to BLE, hx chronic lymphedema. Left knee precautions in place. Pain well controlled on scheduled pain meds, see MAR. Pending PT fredi. Saline locked. Aquacel dressing intact. Gel ice applied. Regular diet, tolerating well. Plan of care updated with pt. Will continue to monitor. All safety measures in place.

## 2023-08-25 NOTE — CM/SW NOTE
Spoke with pt and Sharon from PT. Pt [place] transport at [place]. Pt with Medicaid that would cover costs. Pt has multiple steps into home. At this time, pt buckling with therapy when working on stairs. MD will hold DC until tomorrow. Pt may need transport with lift assist for stairs pending progress with therapy. / to remain available for support and/or discharge planning.      Jackie Mehta, [facility]  Discharge Planner  [phone]

## 2023-08-25 NOTE — PHYSICAL THERAPY NOTE
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 Pt will continue to benefit from ongoing IP PT to maximize functional independence. The AM-PAC '6-Clicks' Inpatient Basic Mobility Short Form was completed and this patient is demonstrating a 42% degree of impairment in mobility. Research supports that patients with this level of impairment may benefit from [address] DISCHARGE RECOMMENDATIONS  PT Discharge Recommendations: Home with home health PT     PLAN  PT Treatment Plan: Bed mobility; Body mechanics; Endurance; Energy conservation;Patient education; Family education;Gait training;Balance training;Transfer training;Stair training;Stoop training;Strengthening  Rehab Potential : Good  Frequency (Obs): Daily    CURRENT GOALS        Goal #1 Patient is able to demonstrate supine - sit EOB @ level: modified independent  MET   Goal #2 Patient is able to demonstrate transfers EOB to/from Chair/Wheelchair at assistance level: supervision      Goal #3 Patient is able to ambulate 150 feet with assist device: walker - rolling at assistance level: supervision      Goal #4 Pt is able to ascend and descend 4 stairs with rail and supervision   Goal #5 Pt is able to ascend and descend stoop step with RW   Goal #6     Goal Comments: Goals established on 8/24/2023 8/25/2023 all goals ongoing      SUBJECTIVE  Pt reports her uncle can not pick her up and she would like a [facility] transport home. SW informed.      OBJECTIVE  Precautions: Bed/chair alarm    WEIGHT BEARING RESTRICTION  Weight Bearing Restriction: L lower extremity L Lower Extremity: Weight Bearing as Tolerated    PAIN ASSESSMENT   Rating: Unable to rate  Location: L HS  Management Techniques: Activity promotion; Body mechanics;Breathing techniques;Relaxation;Repositioning    BALANCE                                                                                                                       Static Sitting: Fair +  Dynamic Sitting: Fair -           Static Standing: Poor +  Dynamic Standing: Poor +    ACTIVITY TOLERANCE                         O2 WALK         AM-PAC '6-Clicks' INPATIENT SHORT FORM - BASIC MOBILITY  How much difficulty does the patient currently have. .. Patient Difficulty: Turning over in bed (including adjusting bedclothes, sheets and blankets)?: None   Patient Difficulty: Sitting down on and standing up from a chair with arms (e.g., wheelchair, bedside commode, etc.): None   Patient Difficulty: Moving from lying on back to sitting on the side of the bed?: None   How much help from another person does the patient currently need. .. Help from Another: Moving to and from a bed to a chair (including a wheelchair)?: A Little   Help from Another: Need to walk in hospital room?: A Little   Help from Another: Climbing 3-5 steps with a railing?: Total       AM-PAC Score:  Raw Score: 19   Approx Degree of Impairment: 41.77%   Standardized Score (AM-PAC Scale): 45.44   CMS Modifier (G-Code): CK    FUNCTIONAL ABILITY STATUS  Gait Assessment   Functional Mobility/Gait Assessment  Gait Assistance: Supervision  Distance (ft): 125  Assistive Device: Rolling walker  Pattern: L Decreased stance time    Skilled Therapy Provided  Pt presents in bed. Significance of achieving good ROM in a timely fashion explained. Pt performed seated therex per TKA protocol. Pt with poor quad activation and unable to perform SAQ and SLR .    Pt gait trained c RW cues for reciprocal gait pattern, WBAT and proper integration of RW    Pt demos PF with weight on toes of L foot and L knee flexion during stance phase. Pt reports she has been compensating this way for years prior to sx. No evidence of L knee buckle or LOB during gait training. Educated pt on sequencing for safe stair negotiation , however, pt L knee darcie when attempting stairs requiring mod A. Pt advised to use RW instead of rollator upon return home. SW, RN, OT and surgeon updated on above via epic chat. Pt has 2  RW for home use. Pt left in chair, needs met. All questions and concerns addressed. Bed Mobility:  Rolling: mod I    Supine<>Sit: mod I    Sit<>Supine: NT      Transfer Mobility:  Sit<>Stand: supervision    Stand<>Sit: supervision    Gait: CGA           THERAPEUTIC EXERCISES  Lower Extremity Ankle pumps  Knee extension  Quad sets  SAQ  SLR  AAROM for SLR and SAQ  HS at EOB   Extension stretch prior to mobility as pt reports tight HS LLE    Upper Extremity      Position Sitting     Repetitions   10-20   Sets   1     Patient End of Session: Up in chair;Needs met;Call light within reach;RN aware of session/findings; All patient questions and concerns addressed    PT Session Time: 57 minutes  Gait Trainin minutes  Therapeutic Activity: 20 minutes  Therapeutic Exercise: 17 minutes   Neuromuscular Re-education:  minutes

## 2023-08-25 NOTE — PROGRESS NOTES
Anesthesia pain followup (late entry)     Patient POD#1 S/p revision of LEFT TOTAL KNEE ARTHROPLASTY with  Adductor canal  block. The patient was seen and evaluated at bedside prior to discharge. Patient was very comfortable. Reported minimal pain. 3/10 pain and able to do PT. Has barely required pain meds. Being discharged home tomorrow. No further questions regarding pain management. Kerrie Jasso M.D.    Pager [phone]

## 2023-08-26 VITALS
OXYGEN SATURATION: 95 % | RESPIRATION RATE: 20 BRPM | TEMPERATURE: 99 F | DIASTOLIC BLOOD PRESSURE: 82 MMHG | WEIGHT: 177.19 LBS | SYSTOLIC BLOOD PRESSURE: 127 MMHG | BODY MASS INDEX: 34.79 KG/M2 | HEART RATE: 78 BPM | HEIGHT: 60 IN

## 2023-08-26 PROCEDURE — 97116 GAIT TRAINING THERAPY: CPT

## 2023-08-26 RX ORDER — ACETAMINOPHEN 325 MG/1
650 TABLET ORAL EVERY 4 HOURS PRN
Refills: 0 | Status: SHIPPED | COMMUNITY
Start: 2023-08-26

## 2023-08-26 NOTE — PHYSICAL THERAPY NOTE
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 Pt had no L knee buckling during this session. Pt demonstrates understanding of HEP per TKA protocol and is discharged from PT at inpt level due pt meeting all goals at this time. DISCHARGE RECOMMENDATIONS  PT Discharge Recommendations: Home with home health PT     PLAN  PT Treatment Plan: Bed mobility; Body mechanics; Endurance; Energy conservation;Patient education; Family education;Gait training;Balance training;Transfer training;Stair training;Stoop training;Strengthening  Rehab Potential : Good  Frequency (Obs): Daily    CURRENT GOALS     Goal #1 Patient is able to demonstrate supine - sit EOB @ level: modified independent  MET   Goal #2 Patient is able to demonstrate transfers EOB to/from Chair/Wheelchair at assistance level: supervision-MET      Goal #3 Patient is able to ambulate 150 feet with assist device: walker - rolling at assistance level: supervision_MET      Goal #4 Pt is able to ascend and descend 4 stairs with rail and supervision-MET   Goal #5    Goal #6     Goal Comments: Goals established on 8/24/2023 8/26/2023 all goals  achieved       SUBJECTIVE  \"I was so excited when the block wore off last night. \"    OBJECTIVE  Precautions: Bed/chair alarm    WEIGHT BEARING RESTRICTION  Weight Bearing Restriction: L lower extremity           L Lower Extremity: Weight Bearing as Tolerated    PAIN ASSESSMENT   Rating: Unable to rate  Location: L knee  Management Techniques:  Activity promotion    BALANCE Static Sitting: Good  Dynamic Sitting: Good           Static Standing: Fair +  Dynamic Standing: Fair    ACTIVITY TOLERANCE                         O2 WALK         AM-PAC '6-Clicks' INPATIENT SHORT FORM - BASIC MOBILITY  How much difficulty does the patient currently have. .. Patient Difficulty: Turning over in bed (including adjusting bedclothes, sheets and blankets)?: None   Patient Difficulty: Sitting down on and standing up from a chair with arms (e.g., wheelchair, bedside commode, etc.): None   Patient Difficulty: Moving from lying on back to sitting on the side of the bed?: None   How much help from another person does the patient currently need. .. Help from Another: Moving to and from a bed to a chair (including a wheelchair)?: None   Help from Another: Need to walk in hospital room?: None   Help from Another: Climbing 3-5 steps with a railing?: A Little       AM-PAC Score:  Raw Score: 23   Approx Degree of Impairment: 11.2%   Standardized Score (AM-PAC Scale): 56.93   CMS Modifier (G-Code): CI    FUNCTIONAL ABILITY STATUS  Gait Assessment   Functional Mobility/Gait Assessment  Gait Assistance: Modified independent  Distance (ft): 150  Assistive Device: Rolling walker  Pattern: L Decreased stance time  Stairs: Stairs  How Many Stairs: 4  Device: 1 Rail  Assist: Supervision  Pattern: Ascend and Descend  Ascend and Descend : Step to    Skilled Therapy Provided    Bed Mobility:  Rolling: not tested   Supine<>Sit: not tested   Sit<>Supine: not tested     Transfer Mobility:  Sit<>Stand: modified indep   Stand<>Sit: modified indep   Gait: pt amb x 150 feet with RW with modified indep. Decrease L terminal knee ext and decrease stance time L LE. Therapist's Comments: per RN pt ok to be seen. Pt received sitting in bedside chair agreeable to therapy. Pt mobility as above.  Pt instructed in step to technique and sequence for stair negotiation with 1 rail. Pt able to return demonstration. Pt was seated at end of stair negotiation and wheeled back to room. Pt questions answered and RN updated. Patient End of Session: Up in chair;Needs met;Call light within reach;RN aware of session/findings; All patient questions and concerns addressed    PT Session Time: 23 minutes  Gait Trainin minutes

## 2023-08-26 NOTE — PLAN OF CARE
Assumed patient care at 0730. Vital signs stable. Patient alert and oriented x 4. Patient states pain is controlled with scheduled tylenol and tramadol. Patient was able to complete stairs unassisted with PT. Discharge orders received. IV discontinued. Discharge instructions reviewed with charge RN, Rita Leyden and patient verbalized understanding. Patient discharged home with residential home health. Problem: Patient/Family Goals  Goal: Patient/Family Long Term Goal  Description: Patient's Long Term Goal: Able to walk without left knee pain    Interventions:  - Follow up with PCP and Dr. Pj Gustafson.  - See additional Care Plan goals for specific interventions  Outcome: Adequate for Discharge  Goal: Patient/Family Short Term Goal  Description: Patient's Short Term Goal: Get up top chair with dinner. Interventions:   - PT eval post op day 0.  - See additional Care Plan goals for specific interventions  Outcome: Adequate for Discharge     Problem: PAIN - ADULT  Goal: Verbalizes/displays adequate comfort level or patient's stated pain goal  Description: INTERVENTIONS:  - Encourage pt to monitor pain and request assistance  - Assess pain using appropriate pain scale  - Administer analgesics based on type and severity of pain and evaluate response  - Implement non-pharmacological measures as appropriate and evaluate response  - Consider cultural and social influences on pain and pain management  - Manage/alleviate anxiety  - Utilize distraction and/or relaxation techniques  - Monitor for opioid side effects  - Notify MD/LIP if interventions unsuccessful or patient reports new pain  - Anticipate increased pain with activity and pre-medicate as appropriate  Outcome: Adequate for Discharge     Problem: SAFETY ADULT - FALL  Goal: Free from fall injury  Description: INTERVENTIONS:  - Assess pt frequently for physical needs  - Identify cognitive and physical deficits and behaviors that affect risk of falls.   - Hines fall precautions as indicated by assessment.  - Educate pt/family on patient safety including physical limitations  - Instruct pt to call for assistance with activity based on assessment  - Modify environment to reduce risk of injury  - Provide assistive devices as appropriate  - Consider OT/PT consult to assist with strengthening/mobility  - Encourage toileting schedule  Outcome: Adequate for Discharge

## 2023-08-26 NOTE — CM/SW NOTE
Notified by RN that pt is ready for DC. Worked w/ PT and was able to climb stairs independently. [facility] transport arranged for 2-2:15. PCS form completed and available for RN to print. CM/SW will remain available for DC planning and/or support.      AZRA OropezaN, VIA [facility]    [id number]

## 2023-08-26 NOTE — PLAN OF CARE
Pt AOx4. VSS on RA. SCD's on. +3 edema to BLE, hx chronic lymphedema. Left knee precautions in place. Pain well controlled on scheduled pain meds, see MAR. Saline locked. Aquacel dressing intact. Gel ice applied. Regular diet, tolerating well. Plan is to see PT again 8/26 to do stairs with knee immobilizer. [facility]. Will continue to monitor. All safety measures in place. 7295: miralax given this AM, last BM 8/23. Pt can now do straight leg raises to L leg without difficulty.

## 2023-08-28 NOTE — DISCHARGE SUMMARY
Outpatient Surgery Discharge Summary    Patient: Johnnie Tate  Medical Record #: [id number]  Age: 51year old  : 1975    Admit date: 2023    Discharge date: 2023     Attending physician: Real Cabrera MD    Admission diagnosis: S/P LEFT PARTIAL KNEE REPLACEMENT  Status post left partial knee replacement    Discharge diagnosis: S/P LEFT PARTIAL KNEE REPLACEMENT  Status post left partial knee replacement    Procedure performed: revision left total knee arthroplasty     Admission condition: good    Hospital course: The patient was taken to the OR for a planned revision of the left partial knee replacement to a total. The procedure was tolerated well, there were no complications. The patient did receive pre-operative antibiotics. The patient was transferred to the PACU in stable condition. The patient recovered well from anesthesia in the PACU. The patient was admitted post operative. Physical therapy was consulted and the patient cleared PT for discharge. Post operative xrays were reviewed and implants  are in good position. The patient will go home on DVT prophylaxis and with pain medication. Patients pain has been well controlled. Vitals have been stable. H&H is stable. The patient is tolerating a diet. Hospital course was uncomplicated.     Discharge condition: good    Disposition: home    Meds: medicines reconciled and prescriptions signed on chart    Activity: see discharge instruction sheet  Diet: see discharge instruction sheet  Wound Care: see discharge instruction sheet    Follow up: Dr. Sylvain Pineda in 2-3 weeks    Signed:  Elia Goodrich MD  2023  6:46 AM

## 2023-09-28 ENCOUNTER — OFFICE VISIT (OUTPATIENT)
Dept: RHEUMATOLOGY | Facility: CLINIC | Age: 48
End: 2023-09-28

## 2023-09-28 ENCOUNTER — LAB ENCOUNTER (OUTPATIENT)
Dept: LAB | Age: 48
End: 2023-09-28
Attending: INTERNAL MEDICINE
Payer: MEDICARE

## 2023-09-28 VITALS
HEIGHT: 60 IN | DIASTOLIC BLOOD PRESSURE: 70 MMHG | HEART RATE: 81 BPM | BODY MASS INDEX: 34.36 KG/M2 | SYSTOLIC BLOOD PRESSURE: 112 MMHG | RESPIRATION RATE: 16 BRPM | WEIGHT: 175 LBS

## 2023-09-28 DIAGNOSIS — M15.9 OSTEOARTHRITIS OF MULTIPLE JOINTS, UNSPECIFIED OSTEOARTHRITIS TYPE: ICD-10-CM

## 2023-09-28 DIAGNOSIS — Z51.81 ENCOUNTER FOR THERAPEUTIC DRUG MONITORING: ICD-10-CM

## 2023-09-28 DIAGNOSIS — E55.9 VITAMIN D DEFICIENCY: ICD-10-CM

## 2023-09-28 DIAGNOSIS — M81.0 AGE-RELATED OSTEOPOROSIS WITHOUT CURRENT PATHOLOGICAL FRACTURE: ICD-10-CM

## 2023-09-28 DIAGNOSIS — M06.09 RHEUMATOID ARTHRITIS OF MULTIPLE SITES WITHOUT RHEUMATOID FACTOR (HCC): Primary | ICD-10-CM

## 2023-09-28 DIAGNOSIS — M06.09 RHEUMATOID ARTHRITIS OF MULTIPLE SITES WITHOUT RHEUMATOID FACTOR (HCC): ICD-10-CM

## 2023-09-28 LAB
ALBUMIN SERPL-MCNC: 3.5 G/DL (ref 3.4–5)
ALT SERPL-CCNC: 33 U/L
AST SERPL-CCNC: 22 U/L (ref 15–37)
BASOPHILS # BLD AUTO: 0.13 X10(3) UL (ref 0–0.2)
BASOPHILS NFR BLD AUTO: 1.3 %
CREAT BLD-MCNC: 0.68 MG/DL
CRP SERPL-MCNC: 0.59 MG/DL (ref ?–0.3)
DEPRECATED RDW RBC AUTO: 44.7 FL (ref 35.1–46.3)
EGFRCR SERPLBLD CKD-EPI 2021: 107 ML/MIN/1.73M2 (ref 60–?)
EOSINOPHIL # BLD AUTO: 0.53 X10(3) UL (ref 0–0.7)
EOSINOPHIL NFR BLD AUTO: 5.5 %
ERYTHROCYTE [DISTWIDTH] IN BLOOD BY AUTOMATED COUNT: 13 % (ref 11–15)
ERYTHROCYTE [SEDIMENTATION RATE] IN BLOOD: 14 MM/HR
HCT VFR BLD AUTO: 37.9 %
HGB BLD-MCNC: 12.4 G/DL
IMM GRANULOCYTES # BLD AUTO: 0.06 X10(3) UL (ref 0–1)
IMM GRANULOCYTES NFR BLD: 0.6 %
LYMPHOCYTES # BLD AUTO: 1.53 X10(3) UL (ref 1–4)
LYMPHOCYTES NFR BLD AUTO: 15.8 %
MCH RBC QN AUTO: 30.9 PG (ref 26–34)
MCHC RBC AUTO-ENTMCNC: 32.7 G/DL (ref 31–37)
MCV RBC AUTO: 94.5 FL
MONOCYTES # BLD AUTO: 1.12 X10(3) UL (ref 0.1–1)
MONOCYTES NFR BLD AUTO: 11.6 %
NEUTROPHILS # BLD AUTO: 6.3 X10 (3) UL (ref 1.5–7.7)
NEUTROPHILS # BLD AUTO: 6.3 X10(3) UL (ref 1.5–7.7)
NEUTROPHILS NFR BLD AUTO: 65.2 %
PLATELET # BLD AUTO: 207 10(3)UL (ref 150–450)
RBC # BLD AUTO: 4.01 X10(6)UL
WBC # BLD AUTO: 9.7 X10(3) UL (ref 4–11)

## 2023-09-28 PROCEDURE — 84450 TRANSFERASE (AST) (SGOT): CPT

## 2023-09-28 PROCEDURE — 96372 THER/PROPH/DIAG INJ SC/IM: CPT | Performed by: INTERNAL MEDICINE

## 2023-09-28 PROCEDURE — 85025 COMPLETE CBC W/AUTO DIFF WBC: CPT

## 2023-09-28 PROCEDURE — 86140 C-REACTIVE PROTEIN: CPT

## 2023-09-28 PROCEDURE — 36415 COLL VENOUS BLD VENIPUNCTURE: CPT

## 2023-09-28 PROCEDURE — 85652 RBC SED RATE AUTOMATED: CPT

## 2023-09-28 PROCEDURE — 84460 ALANINE AMINO (ALT) (SGPT): CPT

## 2023-09-28 PROCEDURE — 82040 ASSAY OF SERUM ALBUMIN: CPT

## 2023-09-28 PROCEDURE — 82565 ASSAY OF CREATININE: CPT

## 2023-09-28 PROCEDURE — 99215 OFFICE O/P EST HI 40 MIN: CPT | Performed by: INTERNAL MEDICINE

## 2023-09-28 RX ORDER — CELECOXIB 200 MG/1
200 CAPSULE ORAL DAILY
COMMUNITY
Start: 2023-09-07 | End: 2023-12-06

## 2023-09-28 RX ORDER — LEFLUNOMIDE 20 MG/1
TABLET ORAL
Qty: 90 TABLET | Refills: 1 | Status: SHIPPED | OUTPATIENT
Start: 2023-09-28

## 2023-09-28 RX ORDER — ACETAMINOPHEN 500 MG
1000 TABLET ORAL EVERY 6 HOURS PRN
COMMUNITY

## 2023-09-28 NOTE — PATIENT INSTRUCTIONS
Restart cimzia 400mg a month  Restart leflunomide 20mg a day   Check labs every 3months   Return to clinic in 4 months. Celebrex as needed.

## 2023-09-28 NOTE — PROGRESS NOTES
Name and  verified. Pt aware she was to receive injection of Cimza. Injections given Bilateral Lower Abdomen subcutaneous and pt tolerated well. Possible local side effects discussed with pt.  Pt aware to follow up in 4 weeks for next injection and to follow up with provider in 3 month    Future Appointments   Date Time Provider Urmila Mitzy   10/26/2023  2:00 PM [facility] 2ND FLR RN [facility] [place]   2023 11:00 AM EC [facility] 2ND FLR RN [facility] [place]   2024 11:00 AM MD Debora Devlin. [address] [facility] [place]
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
## 2023-10-25 ENCOUNTER — PATIENT MESSAGE (OUTPATIENT)
Dept: RHEUMATOLOGY | Facility: CLINIC | Age: 48
End: 2023-10-25

## 2023-10-25 NOTE — TELEPHONE ENCOUNTER
From: Victor Manuel Martinez  To: Gerson Aung  Sent: 10/25/2023 12:15 PM CDT  Subject: Cimzia Injection - had to cancel - I have a cold and possibly the flu    I cancelled my Cimcia injection for tomorrow the 26 of October. The last few days I've been struggling with a cold and stommach issues. I cancelled my injecton for this week. Hoping to reschedle for next week or when I am feeling better. If I can't get it this month I will make sure to get it in november. I just wanted to let you know why I didn't get it yet. I've contacted my primary care doctor to let her know.   Thank you  Anila Hallman

## 2023-11-04 NOTE — TELEPHONE ENCOUNTER
Pt scheduled for November.       Future Appointments   Date Time Provider Urmila Mitzy   11/30/2023 11:00 AM [facility] 2ND FLR RN [facility] [place]   1/25/2024 11:00 AM MD Debora Duran. [address] [facility] [place]

## 2023-11-30 ENCOUNTER — PATIENT MESSAGE (OUTPATIENT)
Dept: RHEUMATOLOGY | Facility: CLINIC | Age: 48
End: 2023-11-30

## 2023-11-30 RX ORDER — HYDROXYCHLOROQUINE SULFATE 200 MG/1
400 TABLET, FILM COATED ORAL DAILY
Qty: 60 TABLET | Refills: 2 | Status: SHIPPED | OUTPATIENT
Start: 2023-11-30 | End: 2023-12-01

## 2023-11-30 NOTE — TELEPHONE ENCOUNTER
From: Yessica Jang  To: Marianne Fredi  Sent: 11/30/2023 11:24 AM CST  Subject: Cimzia injection and side effects America Curiel Methotrexate Plaquenil try again? Because of unwanted side effects developing from taking Cimzia, Barbara decided not to continue the medication. Over a year ago, a small cyst was found on my right kidney and it had grown significantly since I've been on the medication. Thankfully my PCP confirmed it was not cancerous. My breathing issues are slowly getting worse and I had a to have a breathing treatment last Friday. All of these symptoms are very closely related to the possible side effects of this medicaiton. I will continue the other form of treatment but would like to discuss other options that are not on a bilogic medication. Perhaps I can try a previous medicine I did before an oral pill not an injection. My Ulysses Chele had MS and Arthritis and was given Remicaid and that contributed to the non hodgkins lymphopma that killed her at the age of 76. So I will not go on that infusion either. I still want to work with you. But please know after I had my total left knee replacement surgery most of my pain has improved. I   am taking the lefunomide as prescribed and over the counter pain medicine as well to help. For now that seems to be enough. I have a bad head cold and cough, not much of a voice so I cancelled the injection for today. Thank you for your understanding. America monge Please consider these oral medicaitons for me to try again Methotrexate, Plaquenil thank you. I was also tested for covid 19, RSV and the flu again that came back negative.

## 2023-12-01 ENCOUNTER — PATIENT MESSAGE (OUTPATIENT)
Dept: RHEUMATOLOGY | Facility: CLINIC | Age: 48
End: 2023-12-01

## 2023-12-01 NOTE — TELEPHONE ENCOUNTER
From: Ashley Feliz  To: Zan Daniels  Sent: 12/1/2023 11:08 AM CST  Subject: Medication got sent to the wrong pharmacy    The new medicaiton you sent went to the wrong pharmacy. It was to go to  [id number]  [address], [facility] [address]  [phone]  [facility]  [phone]  I contacted [address] [place] [address] to have them transfer the medication. I will pick it up hopefully on monday if my insurance company will help cover the cost.  I am no longer at [facility] as of yesterday. Thank you .  King Espinal

## 2023-12-02 RX ORDER — LEFLUNOMIDE 20 MG/1
TABLET ORAL
Qty: 90 TABLET | Refills: 1 | Status: SHIPPED | OUTPATIENT
Start: 2023-12-02

## 2023-12-02 RX ORDER — HYDROXYCHLOROQUINE SULFATE 200 MG/1
400 TABLET, FILM COATED ORAL DAILY
Qty: 60 TABLET | Refills: 2 | Status: SHIPPED | OUTPATIENT
Start: 2023-12-02

## 2024-02-07 NOTE — TELEPHONE ENCOUNTER
LOV:   Last Refilled:#60, 2rfs  12/2/23    Summary:1  Restart cimzia 400mg a month  Restart leflunomide 20mg a day   Check labs every 3months   Return to clinic in 4 months.   Celebrex as needed.   - tylenola as needed.      Jesus Luz MD  9/28/2023   11:00 AM        Please advise.

## 2024-02-08 RX ORDER — HYDROXYCHLOROQUINE SULFATE 200 MG/1
400 TABLET, FILM COATED ORAL DAILY
Qty: 60 TABLET | Refills: 2 | Status: SHIPPED | OUTPATIENT
Start: 2024-02-08

## 2024-02-10 ENCOUNTER — PATIENT MESSAGE (OUTPATIENT)
Dept: RHEUMATOLOGY | Facility: CLINIC | Age: 49
End: 2024-02-10

## 2024-02-10 DIAGNOSIS — M06.09 RHEUMATOID ARTHRITIS OF MULTIPLE SITES WITHOUT RHEUMATOID FACTOR (HCC): Primary | ICD-10-CM

## 2024-02-13 RX ORDER — HYDROXYCHLOROQUINE SULFATE 200 MG/1
400 TABLET, FILM COATED ORAL DAILY
Qty: 60 TABLET | Refills: 2 | Status: SHIPPED | OUTPATIENT
Start: 2024-02-13

## 2024-02-13 NOTE — TELEPHONE ENCOUNTER
From: America Curiel  To: Jesus Luz  Sent: 2/10/2024 10:09 AM CST  Subject: prescription refill request     I got a message saying that one of my prescriptions was refilled and sent to the [facility] in [place], [place]. I did not make that request. All of my prescriptions have been transferred to [facility] in [place]. I was having problems with the pharmacy staff again. All of my medications have been filled and I don't need any more at this time. It was for the Hydroxychloroquine 200 mg. My Prescriptions should be sent to [facility] [address] [facility] [address], [place] [id number] [phone] I did not make a request to have it refilled. My  [facility] ([facility]) and Josafat Snowden (104) with [facility] [phone] will be in contact with you.

## 2024-02-13 NOTE — TELEPHONE ENCOUNTER
Medication was sent to wrong pharmacy per patient. My Prescriptions should be sent to [address] [address] [phone].      Outpatient Medication Detail     Disp Refills Start End    hydroxychloroquine 200 MG Oral Tab 60 tablet 2 2/8/2024 --    Sig - Route: Take 2 tablets (400 mg total) by mouth daily. - Oral    Sent to pharmacy as: Hydroxychloroquine Sulfate 200 MG Oral Tablet (Plaquenil)    E-Prescribing Status: Receipt confirmed by pharmacy (2/8/2024  6:57 AM CST)      Pharmacy    [id number] - [place] - [address] [phone], [phone]         Future Appointments   Date Time Provider Department Center   5/16/2024  2:50 PM Palaniswami, Purani, MD [facility] [place]

## 2024-04-09 ENCOUNTER — PATIENT MESSAGE (OUTPATIENT)
Dept: RHEUMATOLOGY | Facility: CLINIC | Age: 49
End: 2024-04-09

## 2024-04-09 ENCOUNTER — LAB ENCOUNTER (OUTPATIENT)
Dept: LAB | Age: 49
End: 2024-04-09
Attending: INTERNAL MEDICINE
Payer: MEDICARE

## 2024-04-09 DIAGNOSIS — M06.09 RHEUMATOID ARTHRITIS OF MULTIPLE SITES WITHOUT RHEUMATOID FACTOR (HCC): ICD-10-CM

## 2024-04-09 DIAGNOSIS — E55.9 VITAMIN D DEFICIENCY: ICD-10-CM

## 2024-04-09 DIAGNOSIS — Z51.81 ENCOUNTER FOR THERAPEUTIC DRUG MONITORING: ICD-10-CM

## 2024-04-09 LAB
ALT SERPL-CCNC: 15 U/L
AST SERPL-CCNC: 17 U/L (ref ?–34)
CREAT BLD-MCNC: 0.75 MG/DL
CRP SERPL-MCNC: 0.8 MG/DL (ref ?–1)
DEPRECATED RDW RBC AUTO: 43.2 FL (ref 35.1–46.3)
EGFRCR SERPLBLD CKD-EPI 2021: 98 ML/MIN/1.73M2 (ref 60–?)
ERYTHROCYTE [DISTWIDTH] IN BLOOD BY AUTOMATED COUNT: 12.7 % (ref 11–15)
ERYTHROCYTE [SEDIMENTATION RATE] IN BLOOD: 14 MM/HR
HCT VFR BLD AUTO: 37.7 %
HGB BLD-MCNC: 12.3 G/DL
MCH RBC QN AUTO: 30.2 PG (ref 26–34)
MCHC RBC AUTO-ENTMCNC: 32.6 G/DL (ref 31–37)
MCV RBC AUTO: 92.6 FL
PLATELET # BLD AUTO: 227 10(3)UL (ref 150–450)
RBC # BLD AUTO: 4.07 X10(6)UL
VIT D+METAB SERPL-MCNC: 27.1 NG/ML (ref 30–100)
WBC # BLD AUTO: 12.6 X10(3) UL (ref 4–11)

## 2024-04-09 PROCEDURE — 84460 ALANINE AMINO (ALT) (SGPT): CPT

## 2024-04-09 PROCEDURE — 85027 COMPLETE CBC AUTOMATED: CPT

## 2024-04-09 PROCEDURE — 84450 TRANSFERASE (AST) (SGOT): CPT

## 2024-04-09 PROCEDURE — 36415 COLL VENOUS BLD VENIPUNCTURE: CPT

## 2024-04-09 PROCEDURE — 82306 VITAMIN D 25 HYDROXY: CPT

## 2024-04-09 PROCEDURE — 85652 RBC SED RATE AUTOMATED: CPT

## 2024-04-09 PROCEDURE — 86480 TB TEST CELL IMMUN MEASURE: CPT

## 2024-04-09 PROCEDURE — 82565 ASSAY OF CREATININE: CPT

## 2024-04-09 PROCEDURE — 86140 C-REACTIVE PROTEIN: CPT

## 2024-04-10 ENCOUNTER — OFFICE VISIT (OUTPATIENT)
Dept: RHEUMATOLOGY | Facility: CLINIC | Age: 49
End: 2024-04-10
Payer: MEDICARE

## 2024-04-10 ENCOUNTER — TELEPHONE (OUTPATIENT)
Dept: RHEUMATOLOGY | Facility: CLINIC | Age: 49
End: 2024-04-10

## 2024-04-10 ENCOUNTER — PATIENT MESSAGE (OUTPATIENT)
Dept: RHEUMATOLOGY | Facility: CLINIC | Age: 49
End: 2024-04-10

## 2024-04-10 VITALS
HEIGHT: 60 IN | HEART RATE: 75 BPM | BODY MASS INDEX: 33.61 KG/M2 | RESPIRATION RATE: 16 BRPM | DIASTOLIC BLOOD PRESSURE: 72 MMHG | SYSTOLIC BLOOD PRESSURE: 119 MMHG | WEIGHT: 171.19 LBS

## 2024-04-10 DIAGNOSIS — M06.09 RHEUMATOID ARTHRITIS OF MULTIPLE SITES WITHOUT RHEUMATOID FACTOR (HCC): Primary | ICD-10-CM

## 2024-04-10 DIAGNOSIS — M15.9 OSTEOARTHRITIS OF MULTIPLE JOINTS, UNSPECIFIED OSTEOARTHRITIS TYPE: ICD-10-CM

## 2024-04-10 DIAGNOSIS — M06.09 RHEUMATOID ARTHRITIS OF MULTIPLE SITES WITHOUT RHEUMATOID FACTOR (HCC): ICD-10-CM

## 2024-04-10 DIAGNOSIS — Z51.81 ENCOUNTER FOR THERAPEUTIC DRUG MONITORING: ICD-10-CM

## 2024-04-10 DIAGNOSIS — M81.0 AGE-RELATED OSTEOPOROSIS WITHOUT CURRENT PATHOLOGICAL FRACTURE: ICD-10-CM

## 2024-04-10 PROCEDURE — 96372 THER/PROPH/DIAG INJ SC/IM: CPT | Performed by: INTERNAL MEDICINE

## 2024-04-10 PROCEDURE — 99215 OFFICE O/P EST HI 40 MIN: CPT | Performed by: INTERNAL MEDICINE

## 2024-04-10 RX ORDER — TRIAMCINOLONE ACETONIDE 40 MG/ML
40 INJECTION, SUSPENSION INTRA-ARTICULAR; INTRAMUSCULAR ONCE
Status: COMPLETED | OUTPATIENT
Start: 2024-04-10 | End: 2024-04-10

## 2024-04-10 RX ORDER — UPADACITINIB 15 MG/1
15 TABLET, EXTENDED RELEASE ORAL DAILY
Qty: 30 TABLET | Refills: 5 | Status: SHIPPED | OUTPATIENT
Start: 2024-04-10 | End: 2024-05-10

## 2024-04-10 RX ORDER — UPADACITINIB 15 MG/1
15 TABLET, EXTENDED RELEASE ORAL DAILY
Qty: 30 TABLET | Refills: 5 | Status: SHIPPED | OUTPATIENT
Start: 2024-04-10 | End: 2024-04-10

## 2024-04-10 RX ORDER — CELECOXIB 200 MG/1
200 CAPSULE ORAL DAILY
COMMUNITY
Start: 2024-03-06

## 2024-04-10 RX ADMIN — TRIAMCINOLONE ACETONIDE 40 MG: 40 INJECTION, SUSPENSION INTRA-ARTICULAR; INTRAMUSCULAR at 11:09:00

## 2024-04-10 NOTE — TELEPHONE ENCOUNTER
Patient is calling back to advise the insurance company has advised her of the RINvoq.    Patient is advising that there will be a copay for the the vitamin prescription and patient mentions the provider had advised she can get an over the counter. .   Patient is asking which vitamin she can buy that is equivalent to the Ergocalciferol    Please advise.

## 2024-04-10 NOTE — TELEPHONE ENCOUNTER
Patient is calling to advise Dr. Luz that she did schedule the first available appointment with Dr. Shaan Phillips, Neurology at [facility] for 7/3/24 and on the wait list.

## 2024-04-10 NOTE — PROGRESS NOTES
Name and  verified.Pt aware She was to receive Triamcinolone Injection.Injections given and pt tolerated well.
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
## 2024-04-10 NOTE — TELEPHONE ENCOUNTER
Planning to switch from cimzia to rinvoq 15mg a day - for RA - will need prior auth - sent to local pharmacy - but please let me know the mail order

## 2024-04-10 NOTE — PATIENT INSTRUCTIONS
Try rinvoq 15mg a day -   Restart leflunomide 20mg a day   Check labs every 3months -   Return to clinic in 3 months. July 10th at 1:30 pm for follow up   Celebrex as needed.   Kenalog 40mg Im x 1 today

## 2024-04-10 NOTE — TELEPHONE ENCOUNTER
From: America Curiel  To: Jesus Luz  Sent: 4/9/2024 7:19 PM CDT  Subject: I am having an arthritis flare up and in pain    I stopped taking the plaquinel because of the bad side effects in combination with the lefunomide. I am coming to see you tomorrow in [place] so you can see what I am like during a bad flare up with my arthritis. It's in my right arm hands and knees very painful. I am thankful the test results are coming up normal, but I am still having symptoms, Can we discuss alternative medicatons to try? I know I've tried a few different one's but it's affecting my sleep and my ability to get around. I hurt when I sit, stand and move around and it's painful when I sleep at night as well. See you tomorrow.  America Curiel

## 2024-04-10 NOTE — TELEPHONE ENCOUNTER
Script pended to specialty pharmacy.     [place] Approved    Prior authorization for: Rinvoq    Medication form: 15 mg tablet    Date received: 4/10/2024    Approval #:     Approved dates: through 12/31/204    Pharmacy for medication: [facility]    QF-TB results:

## 2024-04-11 ENCOUNTER — TELEPHONE (OUTPATIENT)
Dept: RHEUMATOLOGY | Facility: CLINIC | Age: 49
End: 2024-04-11

## 2024-04-11 LAB
M TB IFN-G CD4+ T-CELLS BLD-ACNC: 0.02 IU/ML
M TB TUBERC IFN-G BLD QL: NEGATIVE
M TB TUBERC IGNF/MITOGEN IGNF CONTROL: 9.06 IU/ML
QFT TB1 AG MINUS NIL: 0 IU/ML
QFT TB2 AG MINUS NIL: 0.01 IU/ML

## 2024-04-11 NOTE — TELEPHONE ENCOUNTER
LOV: 4/10/24  Last Refilled:#90, 1rf 12/2/23  Labs:AST 17  ALT 15 4/9/24  Summary:1  Try rinvoq 15mg a day -   Restart leflunomide 20mg a day   Check labs every 3months   Return to clinic in 3 months.   Celebrex as needed.   Kenalog 40mg Im x 1 today   - tylenola as needed.      Jesus Luz MD  4/10/2024   10:37 AM  Please advise.
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
## 2024-04-11 NOTE — TELEPHONE ENCOUNTER
Patient calling to let Dr. Luz know that had not been been taking leflunomide, stopped taking it because it was being refilled by other people not authorized to be picked. Stated is now having refill for leflunomide done by [facility] in [place], see TE for refill on 04/11/24. Also stated could not get Vitamin D3 script as was too expensive, so it getting as over the counter.

## 2024-04-12 ENCOUNTER — TELEPHONE (OUTPATIENT)
Dept: RHEUMATOLOGY | Facility: CLINIC | Age: 49
End: 2024-04-12

## 2024-04-12 NOTE — TELEPHONE ENCOUNTER
Patient called in regards to medication below, patient said it is suppose to be sent to [facility] pharmacy but was sent to to [facility] because of this insurance is giving her a hard time, patient is requesting to speak to someone to figure out what's going on, call was disconnected       Medication Detail    Medication Quantity Refills Start End   Upadacitinib ER (RINVOQ) 15 MG Oral Tablet 24 Hr 30 tablet 5 4/10/2024 5/10/2024   Sig:   Take 15 mg by mouth daily.     Route:   Oral     Order #:   [id number]

## 2024-04-12 NOTE — TELEPHONE ENCOUNTER
Patient called requesting to speak with a nurse about her medication. She disconnected the phone call before I could get more information on what she needed.

## 2024-04-14 RX ORDER — LEFLUNOMIDE 20 MG/1
TABLET ORAL
Qty: 90 TABLET | Refills: 1 | Status: SHIPPED | OUTPATIENT
Start: 2024-04-14

## 2024-04-18 NOTE — TELEPHONE ENCOUNTER
Name and  verified. Patient advised to take OTC vitamin 3. She was advised she can apply for assistance with [employer]. Pt has not filled Rinvoq at [facility] at this time due to cost.     Pt stated she only wants to use [facility] at this time.

## 2024-04-29 ENCOUNTER — PATIENT MESSAGE (OUTPATIENT)
Dept: RHEUMATOLOGY | Facility: CLINIC | Age: 49
End: 2024-04-29

## 2024-04-30 NOTE — TELEPHONE ENCOUNTER
From: America Curiel  To: Jesus Hayward  Sent: 4/29/2024 6:31 PM CDT  Subject: From America Curiel - I finally got my prescription today Rinvoq    I was able to start the Rinvoq today It cost the pharmacy $8000 but for me with my insurance $11.20. I will try this for awhile and hopefully it will help. My arthritis has gotten worse in my hands. I think it would benefit me to do an in patient rehabilitation program for awhile. That's why I sent you the message. I am able to do a lot, but admit I do need more help because I do live by myself. Hopefully this medication will help relieve some of the physical pain. I've been in contact with Oscar Duarte Clinical Liaison [email or web address] [facility] [address] [place] [id number].    Thank you.  America Curiel

## 2024-05-28 NOTE — TELEPHONE ENCOUNTER
Patient requests to Pharmacy in [place]      LOV: 4/10/24  Last Refilled:#90, 1rf 4/14/24  Labs:AST 17 ALT 15  4/9/24    Summary:1  Try rinvoq 15mg a day -   Restart leflunomide 20mg a day   Check labs every 3months   Return to clinic in 3 months.   Celebrex as needed.   Kenalog 40mg Im x 1 today   - tylenola as needed.      Jesus Luz MD  4/10/2024   10:37 AM     Total time spent caring for the patient on the day of the encounter:  40 min  This includes pre-charting, reviewing and obtaining results, exam, plan, notes, and counseling.               Electronically signed by Jesus Luz MD at 4/10/2024  Please advise.

## 2024-05-29 RX ORDER — LEFLUNOMIDE 20 MG/1
TABLET ORAL
Qty: 90 TABLET | Refills: 1 | Status: SHIPPED | OUTPATIENT
Start: 2024-05-29

## 2024-06-01 ENCOUNTER — PATIENT MESSAGE (OUTPATIENT)
Dept: RHEUMATOLOGY | Facility: CLINIC | Age: 49
End: 2024-06-01

## 2024-06-01 DIAGNOSIS — M06.09 RHEUMATOID ARTHRITIS OF MULTIPLE SITES WITHOUT RHEUMATOID FACTOR (HCC): ICD-10-CM

## 2024-06-03 RX ORDER — UPADACITINIB 15 MG/1
15 TABLET, EXTENDED RELEASE ORAL DAILY
Qty: 30 TABLET | Refills: 5 | Status: SHIPPED | OUTPATIENT
Start: 2024-06-03 | End: 2024-07-03
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
## 2024-06-03 NOTE — TELEPHONE ENCOUNTER
From: America Curiel  To: Jesus Hayward  Sent: 6/1/2024 6:39 PM CDT  Subject: Leflunomide - i was able to  the prescription and Rinvoq    I was able to  the Leflunomide a few days ago at the [facility] in [place] near [facility] and [facility]. They gave me a 90 day supply. I have not refilled the Rinvoq. All though the medication does seem to be helping it's very expensive. I will need a new prescription sen o [facility] [address] (near [facility] and [facility]) [place] [address] phone [phone]. I still think the medication is worth taking because it is helping my symptoms with the combiniation of the Leflunomide. I have an appointment with you in [place] in July.  If you could send a new prescription for the Rinvoq 15 mg to the [facility] [address] (near [facility] and [facility]) [place] [address] phone [phone] as soon as possible I would appreciate it. I only have a few pills left. And I need the medication sent directly to the [facility] not to a speciaty pharmacy. Thank you.    America Curiel (date if birth 08/12/1975)  [email or web address]   [phone]  [address]  [address]  [facility]

## 2024-06-04 ENCOUNTER — PATIENT MESSAGE (OUTPATIENT)
Dept: RHEUMATOLOGY | Facility: CLINIC | Age: 49
End: 2024-06-04

## 2024-06-04 NOTE — TELEPHONE ENCOUNTER
From: America Curiel  To: Rossyon Natty  Sent: 6/4/2024 7:17 AM CDT  Subject: Request updated letter stating why I need to use my walker please    I need an updated letter please stating why I need to use my walker. I still have a hard time walking without it ... Going long distances, uneven surfaces. I am attaching a copy of the letter dr Garcia wrote for me back in 2023. I am getting my state ID renewed and some people just don't believe in hat I need to use it.  If you can mail a signed copy I would appreciate it and provide a letter for me on [employer].  Thanks  America Curiel date if birth 08/12/1975  [address]  [address]  [place] [facility] [address]  [email or web address]   [phone]

## 2024-06-04 NOTE — TELEPHONE ENCOUNTER
Please see message below and advise. Patient requesting an updated letter to renew her state ID with the need to use a walker.

## 2024-06-11 ENCOUNTER — TELEPHONE (OUTPATIENT)
Dept: RHEUMATOLOGY | Facility: CLINIC | Age: 49
End: 2024-06-11

## 2024-06-11 NOTE — TELEPHONE ENCOUNTER
Patient would like letter dated 6/5/24 re done with her updated address on file. Patient did verify her new address [address] [facility] [address]. Patient would like new letter uploaded in [employer] and also would like letter mailed to her home. Please advise

## 2024-06-20 ENCOUNTER — TELEPHONE (OUTPATIENT)
Dept: RHEUMATOLOGY | Facility: CLINIC | Age: 49
End: 2024-06-20

## 2024-06-20 NOTE — TELEPHONE ENCOUNTER
Patient calling to state Rinvoq and leflunomide combination is working. Patient also wanted to state received letter from Dr. Luz and say thank you.

## 2024-06-28 ENCOUNTER — TELEPHONE (OUTPATIENT)
Dept: RHEUMATOLOGY | Facility: CLINIC | Age: 49
End: 2024-06-28

## 2024-06-28 NOTE — TELEPHONE ENCOUNTER
Patient called and would like to update  that she saw her psychiatrist 6/27 and put her on risperidone .5mg. She said she will bring a list with updated medication to her next visit.

## 2024-07-05 ENCOUNTER — TELEPHONE (OUTPATIENT)
Dept: RHEUMATOLOGY | Facility: CLINIC | Age: 49
End: 2024-07-05
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
## 2024-07-10 ENCOUNTER — LAB ENCOUNTER (OUTPATIENT)
Dept: LAB | Facility: HOSPITAL | Age: 49
End: 2024-07-10
Attending: INTERNAL MEDICINE
Payer: MEDICARE

## 2024-07-10 ENCOUNTER — OFFICE VISIT (OUTPATIENT)
Dept: RHEUMATOLOGY | Facility: CLINIC | Age: 49
End: 2024-07-10
Payer: MEDICARE

## 2024-07-10 VITALS
WEIGHT: 161.81 LBS | HEIGHT: 60 IN | HEART RATE: 83 BPM | SYSTOLIC BLOOD PRESSURE: 110 MMHG | BODY MASS INDEX: 31.77 KG/M2 | DIASTOLIC BLOOD PRESSURE: 67 MMHG | RESPIRATION RATE: 16 BRPM

## 2024-07-10 DIAGNOSIS — M06.09 RHEUMATOID ARTHRITIS OF MULTIPLE SITES WITHOUT RHEUMATOID FACTOR (HCC): ICD-10-CM

## 2024-07-10 DIAGNOSIS — Z51.81 ENCOUNTER FOR THERAPEUTIC DRUG MONITORING: ICD-10-CM

## 2024-07-10 DIAGNOSIS — M06.09 RHEUMATOID ARTHRITIS OF MULTIPLE SITES WITHOUT RHEUMATOID FACTOR (HCC): Primary | ICD-10-CM

## 2024-07-10 LAB
ALT SERPL-CCNC: 38 U/L
AST SERPL-CCNC: 30 U/L (ref ?–34)
CREAT BLD-MCNC: 0.66 MG/DL
CRP SERPL-MCNC: <0.4 MG/DL (ref ?–1)
DEPRECATED RDW RBC AUTO: 45.1 FL (ref 35.1–46.3)
EGFRCR SERPLBLD CKD-EPI 2021: 107 ML/MIN/1.73M2 (ref 60–?)
ERYTHROCYTE [DISTWIDTH] IN BLOOD BY AUTOMATED COUNT: 13.2 % (ref 11–15)
ERYTHROCYTE [SEDIMENTATION RATE] IN BLOOD: 13 MM/HR
HCT VFR BLD AUTO: 40.5 %
HGB BLD-MCNC: 13.2 G/DL
MCH RBC QN AUTO: 30.3 PG (ref 26–34)
MCHC RBC AUTO-ENTMCNC: 32.6 G/DL (ref 31–37)
MCV RBC AUTO: 92.9 FL
PLATELET # BLD AUTO: 244 10(3)UL (ref 150–450)
RBC # BLD AUTO: 4.36 X10(6)UL
WBC # BLD AUTO: 10.1 X10(3) UL (ref 4–11)

## 2024-07-10 PROCEDURE — G2211 COMPLEX E/M VISIT ADD ON: HCPCS | Performed by: INTERNAL MEDICINE

## 2024-07-10 PROCEDURE — 86140 C-REACTIVE PROTEIN: CPT

## 2024-07-10 PROCEDURE — 82565 ASSAY OF CREATININE: CPT

## 2024-07-10 PROCEDURE — 84450 TRANSFERASE (AST) (SGOT): CPT

## 2024-07-10 PROCEDURE — 84460 ALANINE AMINO (ALT) (SGPT): CPT

## 2024-07-10 PROCEDURE — 99214 OFFICE O/P EST MOD 30 MIN: CPT | Performed by: INTERNAL MEDICINE

## 2024-07-10 PROCEDURE — 85027 COMPLETE CBC AUTOMATED: CPT

## 2024-07-10 PROCEDURE — 36415 COLL VENOUS BLD VENIPUNCTURE: CPT

## 2024-07-10 PROCEDURE — 85652 RBC SED RATE AUTOMATED: CPT

## 2024-07-10 NOTE — PATIENT INSTRUCTIONS
Cont. rinvoq 15mg a day -   Cont. leflunomide 20mg a day   Check labs every 3months   Return to clinic in 3 months.   Celebrex as needed.
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
## 2024-09-22 ENCOUNTER — PATIENT MESSAGE (OUTPATIENT)
Dept: INTERNAL MEDICINE CLINIC | Facility: CLINIC | Age: 49
End: 2024-09-22

## 2024-09-23 NOTE — TELEPHONE ENCOUNTER
From: America Curiel  To: LIBERTY JONES  Sent: 9/22/2024 8:11 PM CDT  Subject: I've been having a lot of problems with my prescription insurance card lately. I had to contact the news media. [employer] has not been helpful in resolving the issue. I've attached a copy of my card and my current ID. After my bangs grow out I plan on getti    My first appointment will be in October with you. But wanted to let you know... I've been having a lot of problems with my prescription insurance card lately. I had to contact the news media. [employer] has not been helpful in resolving the issue. I've attached a copy of my card and my current ID. After my bangs grow out I plan on getting an updated picture for my state ID. So far I've been able to get my prescriptions with no issue at [facility] in [place]. Thank you America Curiel  See attachements

## 2024-09-23 NOTE — TELEPHONE ENCOUNTER
Let her figure out first with her insurance and I will order blood work she can call before her rosibel

## 2024-09-24 ENCOUNTER — TELEPHONE (OUTPATIENT)
Dept: RHEUMATOLOGY | Facility: CLINIC | Age: 49
End: 2024-09-24

## 2024-09-24 NOTE — TELEPHONE ENCOUNTER
Patient is calling to advise of [employer] message on 9/21/24    Patient mentions she can wait to see provider if not able to be seen sooner.   Patient is on the wait list.     I've been experiencing an arthritis flare up the last week. Extreme joint pain in my shoulders, elbows hands and back. My Psychiatrist started me on new medicine in June Risperidone .5 mg, Hydorxine 25 mg as needed for anxiety in July and Trazadone 50 mg as needed to help me sleep. I got my updated Covid 19 Vaccine a few weeks ago. Can you give me any additional medicine to help with the pain? I have an appointment with you in [facility].

## 2024-09-24 NOTE — TELEPHONE ENCOUNTER
Patient called  in to; confirm she will be at her Oct 9th appointment.  Send her a mychart to let her know if Dr Powers wants her to do labwork ahead of time.

## 2024-09-25 NOTE — TELEPHONE ENCOUNTER
She did have some blood work done with rheumatology at the previous blood work was in  December I would prefer to see her at the office first and then we can discuss about blood work

## 2024-10-09 ENCOUNTER — PATIENT MESSAGE (OUTPATIENT)
Dept: RHEUMATOLOGY | Facility: CLINIC | Age: 49
End: 2024-10-09

## 2024-10-11 ENCOUNTER — PATIENT MESSAGE (OUTPATIENT)
Dept: RHEUMATOLOGY | Facility: CLINIC | Age: 49
End: 2024-10-11

## 2024-10-16 ENCOUNTER — TELEPHONE (OUTPATIENT)
Dept: RHEUMATOLOGY | Facility: CLINIC | Age: 49
End: 2024-10-16

## 2024-10-16 NOTE — TELEPHONE ENCOUNTER
Noted. Appears rescheduled. Provider notified of condition update.     Future Appointments   Date Time Provider Department Center   12/10/2024  1:30 PM Aron Walton MD [facility]   1/6/2025 10:40 AM [facility]

## 2024-10-16 NOTE — TELEPHONE ENCOUNTER
Patient called to schedule an appointment and to advise the Back of her hands patient has  noticed there was a bump, felt like her cysts are coming back. Patient advised she had to  had to have injections to get rid of them to get rid of it. Believes Possible nodes on her fingers and looks a little different from where her knuckles are.     Patient also advised she just received notice that her medication is ready for pickup and tanner go to pick it up today.       Patient was originally scheduled for 10/23 but had to cancel due to an insurance conflict.

## 2024-11-13 ENCOUNTER — PATIENT MESSAGE (OUTPATIENT)
Dept: RHEUMATOLOGY | Facility: CLINIC | Age: 49
End: 2024-11-13

## 2024-11-13 NOTE — TELEPHONE ENCOUNTER
PLEASE ADVISE.    LOV: 7/10/24  Future Appointments   Date Time Provider Department Center   12/10/2024  1:30 PM Aron Walton MD [facility]   1/6/2025 10:40 AM Jesus Luz MD [facility]     Labs:   Component      Latest Ref Rng 7/10/2024   WBC      4.0 - 11.0 x10(3) uL 10.1    RBC      3.80 - 5.30 x10(6)uL 4.36    Hemoglobin      12.0 - 16.0 g/dL 13.2    Hematocrit      35.0 - 48.0 % 40.5    MCV      80.0 - 100.0 fL 92.9    MCH      26.0 - 34.0 pg 30.3    MCHC      31.0 - 37.0 g/dL 32.6    RDW      11.0 - 15.0 % 13.2    RDW-SD      35.1 - 46.3 fL 45.1    Platelet Count      150.0 - 450.0 10(3)uL 244.0    CREATININE      0.55 - 1.02 mg/dL 0.66    EGFR      >=60 mL/min/1.73m2 107    AST (SGOT)      <=34 U/L 30    C-REACTIVE PROTEIN      <1.00 mg/dL <0.40    SED RATE      0 - 20 mm/Hr 13    ALT (SGPT)      10 - 49 U/L 38               Summary:  Cont. rinvoq 15mg a day -   Cont. leflunomide 20mg a day   Check labs every 3months   Return to clinic in 3 months.   Celebrex as needed.      - tylenola as needed.      Jesus Luz MD  7/10/2024   1:58 PM

## 2024-11-14 NOTE — TELEPHONE ENCOUNTER
Left message provider has some openings and to schedule via My Chart. Pt's current appointment changed to a \"high priority\" in My Chart.

## 2025-01-06 ENCOUNTER — TELEPHONE (OUTPATIENT)
Dept: RHEUMATOLOGY | Facility: CLINIC | Age: 50
End: 2025-01-06

## 2025-01-06 NOTE — TELEPHONE ENCOUNTER
Per patient she is taking Rinvoq . Her out of pocket patient is paying around $12 a month.  Local pharmacy is having out of stock most of the time and per patient would like to know any other replacement she can have instead of getting the medication in the [facility]. Due to transportation issue patient can not see doctor until Spring and she is very sorry in not seeing doctor today due to the weather.

## 2025-01-07 NOTE — TELEPHONE ENCOUNTER
Called patient back. In discussion with patient at this time she would prefer to switch to another medication instead of rinvoq. Does not feel the medication justifies the cost. Has continued leflunomide which she feels helps more than rinvoq.     Her pharmacy is unable to keep supply constant as well so she will miss days waiting on the pharmacy.     Continues to have issues with her hands. No current flare up. Has some cysts that have not gone away. But not as had as before. Currently still able to use hands but can be difficult to write.     Would like alternative medication to rinvoq.       Apologized for missing recent appointments due to weather and transportation issues.     Please advise.

## 2025-01-09 NOTE — TELEPHONE ENCOUNTER
Can she be ok with shipping the medication for 3 month supply to the speciality pharmacy til she sees me?  I think I have done that with rinvoq

## 2025-02-06 ENCOUNTER — OFFICE VISIT (OUTPATIENT)
Dept: INTERNAL MEDICINE CLINIC | Facility: CLINIC | Age: 50
End: 2025-02-06
Payer: MEDICARE

## 2025-02-06 ENCOUNTER — PATIENT MESSAGE (OUTPATIENT)
Dept: RHEUMATOLOGY | Facility: CLINIC | Age: 50
End: 2025-02-06

## 2025-02-06 VITALS
HEIGHT: 60 IN | OXYGEN SATURATION: 99 % | WEIGHT: 160 LBS | BODY MASS INDEX: 31.41 KG/M2 | SYSTOLIC BLOOD PRESSURE: 112 MMHG | HEART RATE: 84 BPM | DIASTOLIC BLOOD PRESSURE: 72 MMHG

## 2025-02-06 DIAGNOSIS — Z12.31 BREAST CANCER SCREENING BY MAMMOGRAM: ICD-10-CM

## 2025-02-06 DIAGNOSIS — L40.50 PSORIATIC ARTHRITIS (HCC): ICD-10-CM

## 2025-02-06 DIAGNOSIS — E03.9 ACQUIRED HYPOTHYROIDISM: Primary | ICD-10-CM

## 2025-02-06 DIAGNOSIS — F31.81 BIPOLAR 2 DISORDER (HCC): ICD-10-CM

## 2025-02-06 PROCEDURE — 99204 OFFICE O/P NEW MOD 45 MIN: CPT | Performed by: INTERNAL MEDICINE

## 2025-02-06 RX ORDER — ESCITALOPRAM OXALATE 10 MG/1
15 TABLET ORAL DAILY
COMMUNITY

## 2025-02-06 RX ORDER — RISPERIDONE 0.5 MG/1
0.5 TABLET ORAL NIGHTLY
COMMUNITY

## 2025-02-06 RX ORDER — LEVOTHYROXINE SODIUM 200 UG/1
200 TABLET ORAL DAILY
Qty: 90 TABLET | Refills: 1 | Status: SHIPPED | OUTPATIENT
Start: 2025-02-06

## 2025-02-06 RX ORDER — TRAZODONE HYDROCHLORIDE 50 MG/1
50 TABLET, FILM COATED ORAL NIGHTLY PRN
COMMUNITY

## 2025-02-06 RX ORDER — UPADACITINIB 15 MG/1
TABLET, EXTENDED RELEASE ORAL
COMMUNITY

## 2025-02-06 NOTE — PATIENT INSTRUCTIONS
Date of Surgery: 8/17/2022    Discharge Summary    Pre-Operative Diagnosis: Special screening for malignant neoplasms, colon [Z12.11]    Post-Operative Diagnosis: normal colon

## 2025-02-06 NOTE — PROGRESS NOTES
The following individual(s) verbally consented to be recorded using ambient AI listening technology and understand that they can each withdraw their consent to this listening technology at any point by asking the clinician to turn off or pause the recording: Yes
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
## 2025-03-13 ENCOUNTER — PATIENT MESSAGE (OUTPATIENT)
Dept: RHEUMATOLOGY | Facility: CLINIC | Age: 50
End: 2025-03-13

## 2025-04-08 ENCOUNTER — PATIENT MESSAGE (OUTPATIENT)
Dept: INTERNAL MEDICINE CLINIC | Facility: CLINIC | Age: 50
End: 2025-04-08

## 2025-04-08 DIAGNOSIS — G89.29 CHRONIC PAIN OF RIGHT KNEE: Primary | ICD-10-CM

## 2025-04-08 DIAGNOSIS — M25.561 CHRONIC PAIN OF RIGHT KNEE: Primary | ICD-10-CM

## 2025-04-10 NOTE — TELEPHONE ENCOUNTER
Patient requesting referral to ortho. Order pended with diagnosis if appropriate. No provider listed.

## 2025-04-14 ENCOUNTER — OFFICE VISIT (OUTPATIENT)
Dept: OBGYN CLINIC | Facility: CLINIC | Age: 50
End: 2025-04-14
Payer: MEDICARE

## 2025-04-14 VITALS
SYSTOLIC BLOOD PRESSURE: 126 MMHG | BODY MASS INDEX: 31.41 KG/M2 | HEIGHT: 60 IN | DIASTOLIC BLOOD PRESSURE: 80 MMHG | WEIGHT: 160 LBS

## 2025-04-14 DIAGNOSIS — R45.86 MOOD CHANGES: ICD-10-CM

## 2025-04-14 DIAGNOSIS — N76.0 VAGINITIS AND VULVOVAGINITIS: ICD-10-CM

## 2025-04-14 DIAGNOSIS — Z12.4 ENCOUNTER FOR PAPANICOLAOU SMEAR FOR CERVICAL CANCER SCREENING: ICD-10-CM

## 2025-04-14 DIAGNOSIS — N92.6 MENSTRUAL CHANGES: ICD-10-CM

## 2025-04-14 DIAGNOSIS — Z11.3 SCREEN FOR STD (SEXUALLY TRANSMITTED DISEASE): Primary | ICD-10-CM

## 2025-04-14 DIAGNOSIS — E83.51 HYPOCALCEMIA: ICD-10-CM

## 2025-04-14 DIAGNOSIS — N64.9 BREAST LESION: ICD-10-CM

## 2025-04-14 DIAGNOSIS — R92.333 HETEROGENEOUSLY DENSE TISSUE OF BOTH BREASTS ON MAMMOGRAPHY: ICD-10-CM

## 2025-04-14 PROCEDURE — 99204 OFFICE O/P NEW MOD 45 MIN: CPT | Performed by: STUDENT IN AN ORGANIZED HEALTH CARE EDUCATION/TRAINING PROGRAM

## 2025-04-14 RX ORDER — BUSPIRONE HYDROCHLORIDE 10 MG/1
10 TABLET ORAL
COMMUNITY
Start: 2023-12-23
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
## 2025-04-15 ENCOUNTER — OFFICE VISIT (OUTPATIENT)
Age: 50
End: 2025-04-15
Payer: MEDICARE

## 2025-04-15 ENCOUNTER — HOSPITAL ENCOUNTER (OUTPATIENT)
Dept: GENERAL RADIOLOGY | Facility: HOSPITAL | Age: 50
Discharge: HOME | End: 2025-04-15
Attending: ORTHOPAEDIC SURGERY
Payer: MEDICARE

## 2025-04-15 VITALS — BODY MASS INDEX: 31.8 KG/M2 | WEIGHT: 162 LBS | HEIGHT: 60 IN

## 2025-04-15 DIAGNOSIS — R52 PAIN: ICD-10-CM

## 2025-04-15 DIAGNOSIS — T84.023A INSTABILITY OF INTERNAL LEFT KNEE PROSTHESIS, INITIAL ENCOUNTER: Primary | ICD-10-CM

## 2025-04-15 DIAGNOSIS — L40.50 PSORIATIC ARTHRITIS (HCC): ICD-10-CM

## 2025-04-15 LAB
BV BACTERIA DNA VAG QL NAA+PROBE: NEGATIVE
C GLABRATA DNA VAG QL NAA+PROBE: NEGATIVE
C KRUSEI DNA VAG QL NAA+PROBE: NEGATIVE
C TRACH DNA SPEC QL NAA+PROBE: NEGATIVE
CANDIDA DNA VAG QL NAA+PROBE: NEGATIVE
HPV E6+E7 MRNA CVX QL NAA+PROBE: NEGATIVE
N GONORRHOEA DNA SPEC QL NAA+PROBE: NEGATIVE
T VAGINALIS DNA VAG QL NAA+PROBE: NEGATIVE

## 2025-04-15 PROCEDURE — 73564 X-RAY EXAM KNEE 4 OR MORE: CPT | Performed by: ORTHOPAEDIC SURGERY

## 2025-04-15 PROCEDURE — 99204 OFFICE O/P NEW MOD 45 MIN: CPT | Performed by: ORTHOPAEDIC SURGERY
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
## 2025-04-15 NOTE — ASSESSMENT & PLAN NOTE
Orders:    DME - External    Physical Therapy Referral - [facility] [place]

## 2025-04-16 ENCOUNTER — PATIENT MESSAGE (OUTPATIENT)
Dept: INTERNAL MEDICINE CLINIC | Facility: CLINIC | Age: 50
End: 2025-04-16

## 2025-04-16 ENCOUNTER — PATIENT MESSAGE (OUTPATIENT)
Dept: RHEUMATOLOGY | Facility: CLINIC | Age: 50
End: 2025-04-16

## 2025-04-16 ENCOUNTER — TELEPHONE (OUTPATIENT)
Dept: OBGYN CLINIC | Facility: CLINIC | Age: 50
End: 2025-04-16

## 2025-04-16 DIAGNOSIS — L40.50 PSORIATIC ARTHRITIS (HCC): Primary | ICD-10-CM

## 2025-04-16 DIAGNOSIS — T84.023S INSTABILITY OF INTERNAL LEFT KNEE PROSTHESIS, SEQUELA: ICD-10-CM

## 2025-04-16 NOTE — TELEPHONE ENCOUNTER
Yes please tell her I apologize for the delay; will do this afternoon after I finish seeing patients.

## 2025-04-16 NOTE — TELEPHONE ENCOUNTER
Patient asking for after visit summary from 4/14, follow up- notes especially.  Patient also waiting for orders to get some test done for transvaginal ultrasound is missing and medication (a cream) for this to be done more comfortably.    Pls advise

## 2025-04-17 NOTE — TELEPHONE ENCOUNTER
I have ordered.  It looks like  HER  orthopedic doctor already ordered for the walker.  Please inform her.  Thank you

## 2025-04-18 ENCOUNTER — TELEPHONE (OUTPATIENT)
Dept: INTERNAL MEDICINE CLINIC | Facility: CLINIC | Age: 50
End: 2025-04-18

## 2025-04-18 NOTE — TELEPHONE ENCOUNTER
Order Questions    Question Answer   What DME is needed: WALKER   Notify staff to enter order in [facility]? Yes       Please addend your office visit in February    Needs documentation of mobility limitation that affects their ability to engage in one or more daily activities to mobility   Patient is able to use cane or walker safely, and their functional mobility deficit can be adequately addressed by using the cane or walker.

## 2025-04-19 ENCOUNTER — PATIENT MESSAGE (OUTPATIENT)
Dept: OBGYN CLINIC | Facility: CLINIC | Age: 50
End: 2025-04-19

## 2025-04-21 NOTE — TELEPHONE ENCOUNTER
See patient message confirming new order was not needed, already has walker.   Patient was requesting letter of medical necessity.  Patient notified via [employer]VChart to follow up with Rheum, Dr Luz who has provided the letter in the past as patient requested from their office as well on 4/16/25.

## 2025-04-22 NOTE — TELEPHONE ENCOUNTER
Routed to rheumatology staff who sent previous letter.  Can you please provide the patient with an updated letter with her correct address?

## 2025-04-23 NOTE — TELEPHONE ENCOUNTER
Dr. Luz, Please see patient message, she is agreeable to the video visit. It looks like you have a video visit on April 29th, can we offer the patient the 29th? Thank you.

## 2025-04-23 NOTE — TELEPHONE ENCOUNTER
Dr. Luz,. Patient requesting a new letter of medical necessity with her new address.   Please advise.    Letter by Jesus Luz MD on 6/5/2024     [facility]  [address] [place] [address]  Ph: [phone]  Fax: [phone]      6/5/2024                                                                                               America Curiel                                                                                      [address]                                                                                      [facility] [address]                        To Whom It May Concern ,      America Curiel is currently under my care. She has severe osteoarthritis in her lower extremities, and requires a walker.  It allows her to be more active.     Thank you for your attention.  Please let my office know if you need further information.     Sincerely,                Jesus Luz MD  Rheumatology

## 2025-04-25 ENCOUNTER — PATIENT MESSAGE (OUTPATIENT)
Dept: OBGYN CLINIC | Facility: CLINIC | Age: 50
End: 2025-04-25

## 2025-05-07 ENCOUNTER — LAB ENCOUNTER (OUTPATIENT)
Dept: LAB | Facility: HOSPITAL | Age: 50
End: 2025-05-07
Attending: INTERNAL MEDICINE
Payer: MEDICARE

## 2025-05-07 DIAGNOSIS — L40.50 PSORIATIC ARTHRITIS (HCC): ICD-10-CM

## 2025-05-07 DIAGNOSIS — E03.9 ACQUIRED HYPOTHYROIDISM: ICD-10-CM

## 2025-05-07 DIAGNOSIS — F31.81 BIPOLAR 2 DISORDER (HCC): ICD-10-CM

## 2025-05-07 LAB
ALBUMIN SERPL-MCNC: 4.1 G/DL (ref 3.2–4.8)
ALBUMIN/GLOB SERPL: 1.8 {RATIO} (ref 1–2)
ALP LIVER SERPL-CCNC: 53 U/L (ref 39–100)
ALT SERPL-CCNC: 8 U/L (ref 10–49)
ANION GAP SERPL CALC-SCNC: 7 MMOL/L (ref 0–18)
AST SERPL-CCNC: 18 U/L (ref ?–34)
BILIRUB SERPL-MCNC: 0.4 MG/DL (ref 0.3–1.2)
BUN BLD-MCNC: 11 MG/DL (ref 9–23)
BUN/CREAT SERPL: 15.9 (ref 10–20)
CALCIUM BLD-MCNC: 7.1 MG/DL (ref 8.7–10.4)
CHLORIDE SERPL-SCNC: 104 MMOL/L (ref 98–112)
CHOLEST SERPL-MCNC: 196 MG/DL (ref ?–200)
CO2 SERPL-SCNC: 29 MMOL/L (ref 21–32)
CREAT BLD-MCNC: 0.69 MG/DL (ref 0.55–1.02)
DEPRECATED RDW RBC AUTO: 43.5 FL (ref 35.1–46.3)
EGFRCR SERPLBLD CKD-EPI 2021: 106 ML/MIN/1.73M2 (ref 60–?)
ERYTHROCYTE [DISTWIDTH] IN BLOOD BY AUTOMATED COUNT: 13.2 % (ref 11–15)
FASTING PATIENT LIPID ANSWER: YES
FASTING STATUS PATIENT QL REPORTED: YES
GLOBULIN PLAS-MCNC: 2.3 G/DL (ref 2–3.5)
GLUCOSE BLD-MCNC: 89 MG/DL (ref 70–99)
HCT VFR BLD AUTO: 37 % (ref 35–48)
HDLC SERPL-MCNC: 39 MG/DL (ref 40–59)
HGB BLD-MCNC: 11.9 G/DL (ref 12–16)
LDLC SERPL CALC-MCNC: 120 MG/DL (ref ?–100)
MCH RBC QN AUTO: 28.9 PG (ref 26–34)
MCHC RBC AUTO-ENTMCNC: 32.2 G/DL (ref 31–37)
MCV RBC AUTO: 89.8 FL (ref 80–100)
NONHDLC SERPL-MCNC: 157 MG/DL (ref ?–130)
OSMOLALITY SERPL CALC.SUM OF ELEC: 289 MOSM/KG (ref 275–295)
PLATELET # BLD AUTO: 176 10(3)UL (ref 150–450)
POTASSIUM SERPL-SCNC: 3.9 MMOL/L (ref 3.5–5.1)
PROT SERPL-MCNC: 6.4 G/DL (ref 5.7–8.2)
RBC # BLD AUTO: 4.12 X10(6)UL (ref 3.8–5.3)
SODIUM SERPL-SCNC: 140 MMOL/L (ref 136–145)
T4 FREE SERPL-MCNC: 0.8 NG/DL (ref 0.8–1.7)
TRIGL SERPL-MCNC: 208 MG/DL (ref 30–149)
TSI SER-ACNC: 15.24 UIU/ML (ref 0.55–4.78)
VLDLC SERPL CALC-MCNC: 37 MG/DL (ref 0–30)
WBC # BLD AUTO: 9.4 X10(3) UL (ref 4–11)

## 2025-05-07 PROCEDURE — 85027 COMPLETE CBC AUTOMATED: CPT

## 2025-05-07 PROCEDURE — 36415 COLL VENOUS BLD VENIPUNCTURE: CPT

## 2025-05-07 PROCEDURE — 80053 COMPREHEN METABOLIC PANEL: CPT

## 2025-05-07 PROCEDURE — 84439 ASSAY OF FREE THYROXINE: CPT

## 2025-05-07 PROCEDURE — 80061 LIPID PANEL: CPT

## 2025-05-07 PROCEDURE — 84443 ASSAY THYROID STIM HORMONE: CPT

## 2025-05-10 ENCOUNTER — PATIENT MESSAGE (OUTPATIENT)
Dept: INTERNAL MEDICINE CLINIC | Facility: CLINIC | Age: 50
End: 2025-05-10

## 2025-05-10 DIAGNOSIS — E03.9 HYPOTHYROIDISM, UNSPECIFIED TYPE: Primary | ICD-10-CM

## 2025-05-15 ENCOUNTER — TELEPHONE (OUTPATIENT)
Dept: INTERNAL MEDICINE CLINIC | Facility: CLINIC | Age: 50
End: 2025-05-15

## 2025-05-15 ENCOUNTER — PATIENT MESSAGE (OUTPATIENT)
Dept: INTERNAL MEDICINE CLINIC | Facility: CLINIC | Age: 50
End: 2025-05-15

## 2025-05-15 NOTE — TELEPHONE ENCOUNTER
Call received from scheduling department.     Patient called to inform us that she received Moderna Covid vaccine and 1st dose of Shingrix today at the [facility] in [place].     Patient would like a [employer] message sent to confirm when her chart has been updated.     RN ran Immunization Registry update, the vaccines are not yet showing.     RN attempted to call patient, no answer.   Left detailed message that RN will notify patient when we have been able to update her chart.     Left call back number and hours for any questions.    Postponed to check for immunization update tomorrow.

## 2025-05-16 NOTE — TELEPHONE ENCOUNTER
Per MyChart message from patient, emergency contact list has been updated.   MyChart message sent to patient to inform.
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
## 2025-05-23 ENCOUNTER — OFFICE VISIT (OUTPATIENT)
Age: 50
End: 2025-05-23
Payer: MEDICARE

## 2025-05-23 ENCOUNTER — TELEPHONE (OUTPATIENT)
Age: 50
End: 2025-05-23

## 2025-05-23 VITALS
SYSTOLIC BLOOD PRESSURE: 119 MMHG | WEIGHT: 160 LBS | HEIGHT: 60 IN | HEART RATE: 59 BPM | BODY MASS INDEX: 31.41 KG/M2 | DIASTOLIC BLOOD PRESSURE: 74 MMHG

## 2025-05-23 DIAGNOSIS — Z01.89 ENCOUNTER FOR OTHER SPECIFIED SPECIAL EXAMINATIONS: ICD-10-CM

## 2025-05-23 DIAGNOSIS — M06.4 INFLAMMATORY POLYARTHRITIS (HCC): ICD-10-CM

## 2025-05-23 DIAGNOSIS — L40.50 PSORIATIC ARTHRITIS (HCC): ICD-10-CM

## 2025-05-23 DIAGNOSIS — M06.09 RHEUMATOID ARTHRITIS OF MULTIPLE SITES WITHOUT RHEUMATOID FACTOR (HCC): ICD-10-CM

## 2025-05-23 DIAGNOSIS — Z79.899 ENCOUNTER FOR LONG-TERM (CURRENT) USE OF HIGH-RISK MEDICATION: Primary | ICD-10-CM

## 2025-05-23 DIAGNOSIS — M06.09 RHEUMATOID ARTHRITIS OF MULTIPLE SITES WITHOUT RHEUMATOID FACTOR (HCC): Primary | ICD-10-CM

## 2025-05-23 RX ORDER — LEFLUNOMIDE 20 MG/1
TABLET ORAL
Qty: 90 TABLET | Refills: 2 | Status: SHIPPED | OUTPATIENT
Start: 2025-05-23

## 2025-05-23 NOTE — TELEPHONE ENCOUNTER
Hello-this patient has rheumatoid arthritis/psoriatic arthritis with persistent symptoms despite Rinvoq and leflunomide.  Can we please look into a PA for Xeljanz instead?  Thank you.    Xeljanz: Oral    IR tablet: 5 mg twice daily.    ER tablet: 11 mg once daily.    Last QuantiFERON TB testin24  Last Hepatitis testing: ordered    Current Medications:  Rinvoq 15 mg daily- started 2024  Leflunomide 20 mg daily- since   Celebrex 200 mg daily    Medication History:  Cimzia-improvement st 2022- 23, stopped given concerns of kidney cysts, modifed to +HCQ witth leflunomide  Humira-side effects (dizzy, rashes, GI upset, drowsiness)  HCQ- GI upset when taking with leflunomide    PO Steroids- tries to avoid dysphoria. Prefer IM steroids.

## 2025-05-27 RX ORDER — CELECOXIB 200 MG/1
200 CAPSULE ORAL DAILY
Qty: 30 CAPSULE | Refills: 1 | OUTPATIENT
Start: 2025-05-27

## 2025-05-27 RX ORDER — CELECOXIB 200 MG/1
200 CAPSULE ORAL DAILY
Qty: 30 CAPSULE | Refills: 1 | Status: SHIPPED | OUTPATIENT
Start: 2025-05-27

## 2025-05-27 NOTE — TELEPHONE ENCOUNTER
Requested Prescriptions     Pending Prescriptions Disp Refills    celecoxib 200 MG Oral Cap 30 capsule 1     Sig: Take 1 capsule (200 mg total) by mouth daily.     LOV: 5/23/25  Future Appointments   Date Time Provider Department Center   6/4/2025  1:00 PM Fidel Torresrey, PT [facility] PT EM [facility]   6/6/2025 10:45 AM Fidel Torresrey, PT [facility] PT [facility]   6/11/2025 12:15 PM Fidel Torresrey, PT [facility] PT [facility]   6/13/2025 10:45 AM Fidel Torresrey, PT [facility] PT [facility]   6/18/2025 10:00 AM Lambert Torres, PT [facility] PT [facility]   6/25/2025 10:00 AM Fidel Torresrey, PT [facility] PT [facility]   7/2/2025 10:00 AM Fidel Torresrey, PT [facility] PT [facility]   7/8/2025 10:15 AM Alec Valero MD EMG ORTH [facility] EMMG 10 [facility]   7/31/2025 11:00 AM Aron Walton MD [facility]   9/25/2025  2:20 PM Jones Rios DO [facility]     Labs:   Component      Latest Ref Rng 5/7/2025   Glucose      70 - 99 mg/dL 89    Sodium      136 - 145 mmol/L 140    Potassium      3.5 - 5.1 mmol/L 3.9    Chloride      98 - 112 mmol/L 104    Carbon Dioxide, Total      21.0 - 32.0 mmol/L 29.0    ANION GAP      0 - 18 mmol/L 7    BUN      9 - 23 mg/dL 11    CREATININE      0.55 - 1.02 mg/dL 0.69    BUN/CREATININE RATIO      10.0 - 20.0  15.9    CALCIUM      8.7 - 10.4 mg/dL 7.1 (L)    CALCULATED OSMOLALITY      275 - 295 mOsm/kg 289    EGFR      >=60 mL/min/1.73m2 106    ALT (SGPT)      10 - 49 U/L 8 (L)    AST (SGOT)      <34 U/L 18    ALKALINE PHOSPHATASE      39 - 100 U/L 53    Total Bilirubin      0.3 - 1.2 mg/dL 0.4    PROTEIN, TOTAL      5.7 - 8.2 g/dL 6.4    Albumin      3.2 - 4.8 g/dL 4.1    Globulin      2.0 - 3.5 g/dL 2.3    A/G Ratio      1.0 - 2.0  1.8    Patient Fasting for CMP? Yes    WBC      4.0 - 11.0 x10(3) uL 9.4    RBC      3.80 - 5.30 x10(6)uL 4.12    Hemoglobin      12.0 - 16.0 g/dL 11.9 (L)    Hematocrit      35.0 - 48.0 % 37.0    MCV      80.0 - 100.0 fL 89.8    MCH      26.0 - 34.0 pg 28.9    MCHC      31.0 - 37.0 g/dL 32.2    RDW      11.0 - 15.0 % 13.2     RDW-SD      35.1 - 46.3 fL 43.5    Platelet Count      150.0 - 450.0 10(3)uL 176.0       Legend:  (L) Low    PLAN:  - Rinvoq 15 mg daily for now.  Will look into PA for Xeljanz.  Patient aware that if Xeljanz is approved, will substitute Rinvoq for Xeljanz 1-1       Risk of Xeljanz discussed with patient including but not limited to increased risk of thrombosis, cardiac events, blood count abnormalities, increased risk of infection including shingles, weight gain, skin reactions, GI symptoms, hepatotoxicity, and possible risk of lymphoma.   - Continue leflunomide 20 mg daily       Risk of leflunomide include possibility of liver toxicity (requiring close lab monitoring), risk of blood count abnormalities, increased risk of infection, GI upset.  Leflunomide is also a teratogenic agent and dangerous in pregnancy.  - As needed Celebrex 200 mg daily          Would avoid other OTC NSAIDs while on Celebrex given increased risk of side effects including GI upset and GI bleed.  - Consult/evaluation communicated with referring physician/provider.     PA for Xeljanz and I will MyChart updated infection screen test.  Otherwise, we will continue close lab monitoring and plan to repeat labs prior to follow-up appointment     Jones Rios DO  5/23/2025

## 2025-05-29 ENCOUNTER — TELEPHONE (OUTPATIENT)
Dept: INTERNAL MEDICINE CLINIC | Facility: CLINIC | Age: 50
End: 2025-05-29

## 2025-05-30 NOTE — TELEPHONE ENCOUNTER
Script pended for review.    PA Approved    Prior authorization for: Xeljanz XR     Medication form: 11 mg tablet    Approval #: PA Case: [id number]    Approved dates: PA Case: [id number]    Pharmacy for medication:  Coverage Starts on: 1/1/2025 12:00:00 AM, Coverage Ends on: 12/31/2025 12:00:00 AM

## 2025-05-30 NOTE — TELEPHONE ENCOUNTER
PA start    Prior authorization for:  Xeljanz XR    Medication form: 11 mg     Submission method: Surecripts    Tracking #:    QF-TB result:     Component      Latest Ref Rng 4/9/2024   Quantiferon TB NIL      IU/mL 0.02    Quantiferon-TB1 Minus NIL      IU/mL 0.00    Quantiferon-TB2 Minus NIL      IU/mL 0.01    Quantiferon TB Mitogen minus NIL      IU/mL 9.06    Quantiferon TB Result      Negative  Negative

## 2025-06-02 ENCOUNTER — LAB ENCOUNTER (OUTPATIENT)
Dept: LAB | Facility: HOSPITAL | Age: 50
End: 2025-06-02
Attending: INTERNAL MEDICINE
Payer: MEDICARE

## 2025-06-02 ENCOUNTER — PATIENT MESSAGE (OUTPATIENT)
Age: 50
End: 2025-06-02

## 2025-06-02 DIAGNOSIS — Z01.89 ENCOUNTER FOR OTHER SPECIFIED SPECIAL EXAMINATIONS: ICD-10-CM

## 2025-06-02 DIAGNOSIS — M06.4 INFLAMMATORY POLYARTHRITIS (HCC): ICD-10-CM

## 2025-06-02 DIAGNOSIS — L40.50 PSORIATIC ARTHRITIS (HCC): ICD-10-CM

## 2025-06-02 DIAGNOSIS — M06.09 RHEUMATOID ARTHRITIS OF MULTIPLE SITES WITHOUT RHEUMATOID FACTOR (HCC): ICD-10-CM

## 2025-06-02 LAB
HAV IGM SER QL: NONREACTIVE
HBV CORE IGM SER QL: NONREACTIVE
HBV SURFACE AG SERPL QL IA: NONREACTIVE
HCV AB SERPL QL IA: NONREACTIVE

## 2025-06-02 PROCEDURE — 86480 TB TEST CELL IMMUN MEASURE: CPT

## 2025-06-02 PROCEDURE — 36415 COLL VENOUS BLD VENIPUNCTURE: CPT

## 2025-06-02 PROCEDURE — 80074 ACUTE HEPATITIS PANEL: CPT

## 2025-06-03 ENCOUNTER — PATIENT MESSAGE (OUTPATIENT)
Age: 50
End: 2025-06-03

## 2025-06-04 ENCOUNTER — OFFICE VISIT (OUTPATIENT)
Dept: PHYSICAL THERAPY | Facility: HOSPITAL | Age: 50
End: 2025-06-04
Attending: ORTHOPAEDIC SURGERY
Payer: MEDICARE

## 2025-06-04 DIAGNOSIS — T84.023A INSTABILITY OF INTERNAL LEFT KNEE PROSTHESIS, INITIAL ENCOUNTER: Primary | ICD-10-CM

## 2025-06-04 DIAGNOSIS — L40.50 PSORIATIC ARTHRITIS (HCC): ICD-10-CM

## 2025-06-04 LAB
M TB IFN-G CD4+ T-CELLS BLD-ACNC: 0.03 IU/ML
M TB TUBERC IFN-G BLD QL: NEGATIVE
M TB TUBERC IGNF/MITOGEN IGNF CONTROL: >10 IU/ML
QFT TB1 AG MINUS NIL: -0.01 IU/ML
QFT TB2 AG MINUS NIL: -0.01 IU/ML

## 2025-06-04 PROCEDURE — 97110 THERAPEUTIC EXERCISES: CPT | Performed by: PHYSICAL THERAPIST

## 2025-06-04 PROCEDURE — 97140 MANUAL THERAPY 1/> REGIONS: CPT | Performed by: PHYSICAL THERAPIST

## 2025-06-04 PROCEDURE — 97162 PT EVAL MOD COMPLEX 30 MIN: CPT | Performed by: PHYSICAL THERAPIST

## 2025-06-04 RX ORDER — TOFACITINIB 11 MG/1
11 TABLET, FILM COATED, EXTENDED RELEASE ORAL DAILY
Qty: 30 TABLET | Refills: 5 | Status: SHIPPED | OUTPATIENT
Start: 2025-06-04
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
## 2025-06-06 ENCOUNTER — APPOINTMENT (OUTPATIENT)
Dept: PHYSICAL THERAPY | Facility: HOSPITAL | Age: 50
End: 2025-06-06
Attending: ORTHOPAEDIC SURGERY
Payer: MEDICARE

## 2025-06-11 ENCOUNTER — APPOINTMENT (OUTPATIENT)
Dept: PHYSICAL THERAPY | Facility: HOSPITAL | Age: 50
End: 2025-06-11
Attending: ORTHOPAEDIC SURGERY
Payer: MEDICARE

## 2025-06-13 ENCOUNTER — TELEPHONE (OUTPATIENT)
Dept: PHYSICAL THERAPY | Facility: HOSPITAL | Age: 50
End: 2025-06-13

## 2025-06-13 ENCOUNTER — APPOINTMENT (OUTPATIENT)
Dept: PHYSICAL THERAPY | Facility: HOSPITAL | Age: 50
End: 2025-06-13
Attending: ORTHOPAEDIC SURGERY
Payer: MEDICARE

## 2025-06-13 NOTE — TELEPHONE ENCOUNTER
LVM to follow-up on pt's [employer] message expressing her concerns and reasons for cxl'ing her PT appts. Provided my direct line if she wishes to call me back.

## 2025-06-18 ENCOUNTER — APPOINTMENT (OUTPATIENT)
Dept: PHYSICAL THERAPY | Facility: HOSPITAL | Age: 50
End: 2025-06-18
Attending: ORTHOPAEDIC SURGERY
Payer: MEDICARE

## 2025-06-25 ENCOUNTER — APPOINTMENT (OUTPATIENT)
Dept: PHYSICAL THERAPY | Facility: HOSPITAL | Age: 50
End: 2025-06-25
Attending: ORTHOPAEDIC SURGERY
Payer: MEDICARE

## 2025-07-02 ENCOUNTER — APPOINTMENT (OUTPATIENT)
Dept: PHYSICAL THERAPY | Facility: HOSPITAL | Age: 50
End: 2025-07-02
Attending: ORTHOPAEDIC SURGERY

## 2025-08-29 ENCOUNTER — TELEPHONE (OUTPATIENT)
Dept: RHEUMATOLOGY | Facility: CLINIC | Age: 50
End: 2025-08-29

## (undated) DEVICE — SET TUBI Y FL CNTRL INFL/OTFL

## (undated) DEVICE — SUCTION CANISTER, 3000CC,SAFELINER: Brand: DEROYAL

## (undated) DEVICE — SOCK CNSTR 4IN TNPSL UNV SPEC

## (undated) DEVICE — STERILE LATEX POWDER-FREE SURGICAL GLOVESWITH NITRILE COATING: Brand: PROTEXIS

## (undated) DEVICE — COVER SGL STRL LGHT HNDL BLU

## (undated) DEVICE — SOL  .9 3000ML

## (undated) DEVICE — GOWN SURG AERO BLUE PERF LG

## (undated) DEVICE — MYOSURE SINGLE USE SEAL SET

## (undated) DEVICE — DEVICE SPEC RTRVL MYOSURE

## (undated) DEVICE — HYSTEROSCOPY: Brand: MEDLINE INDUSTRIES, INC.

## (undated) NOTE — LETTER
5/22/2025              America Curiel        [address] [facility] [address] [place] [id number]         Dear America,    America Curiel is currently under my care. She has severe osteoarthritis in her lower extremities, and requires a walker.  It allows her to be more active.     Thank you for your attention.  Please let my office know if you need further information    Sincerely,        Jesus Luz MD          Document electronically generated by:  Jesus Luz MD

## (undated) NOTE — IP AVS SNAPSHOT
[facility] [address], [place] ~ [phone]                Discharge Summary   3/3/2017    [place] Wolfe           Admission Information        Provider Department    3/3/2017 Mo Guadalupe MD [facility] Pre-Op Rec feel like your normal self.       We Recommend:   · Do not drive any motor vehicle or bicycle   · Avoid mowing the lawn, playing sports, or working with power tools/applicances (power saws, electric knives or mixers)   · That you have someone stay with you Kayla Levy and your surgeon are proudly participating in the [facility] \"National Surgical quality Improvement Program\" (NSQIP).   Questionnaires will be mailed to randomly selected surgery patients 30 days after their surg 4.0 -- -- (01/30/17)  7.13 (H) (01/30/17)  1.90 (01/30/17)  1.34 (H) (01/30/17)  0.44 (H) (01/30/17)  0.10      Radiology Exams     None         Additional Information       We are concerned for your overall well being:    - If you are a smoker or have smo